# Patient Record
Sex: MALE | Race: BLACK OR AFRICAN AMERICAN
[De-identification: names, ages, dates, MRNs, and addresses within clinical notes are randomized per-mention and may not be internally consistent; named-entity substitution may affect disease eponyms.]

---

## 2020-05-23 ENCOUNTER — HOSPITAL ENCOUNTER (EMERGENCY)
Dept: HOSPITAL 88 - FSED | Age: 35
Discharge: HOME | End: 2020-05-23
Payer: COMMERCIAL

## 2020-05-23 VITALS — WEIGHT: 125 LBS | HEIGHT: 65 IN | BODY MASS INDEX: 20.83 KG/M2

## 2020-05-23 DIAGNOSIS — R10.32: Primary | ICD-10-CM

## 2020-05-23 DIAGNOSIS — Z87.11: ICD-10-CM

## 2020-05-23 DIAGNOSIS — N20.0: ICD-10-CM

## 2020-05-23 DIAGNOSIS — F17.210: ICD-10-CM

## 2020-05-23 DIAGNOSIS — I70.0: ICD-10-CM

## 2020-05-23 DIAGNOSIS — R31.9: ICD-10-CM

## 2020-05-23 PROCEDURE — 74176 CT ABD & PELVIS W/O CONTRAST: CPT

## 2020-05-23 PROCEDURE — 96372 THER/PROPH/DIAG INJ SC/IM: CPT

## 2020-05-23 PROCEDURE — 99284 EMERGENCY DEPT VISIT MOD MDM: CPT

## 2020-05-23 PROCEDURE — 81003 URINALYSIS AUTO W/O SCOPE: CPT

## 2020-05-23 NOTE — XMS REPORT
Patient Summary Document

                             Created on: 2020



JACKLYNJAIDEN

External Reference #: [id number]

: 1985

Sex: Male



Demographics





                          Address                   [address]

[place]  [address]

 

                          Home Phone                [phone]

 

                          Preferred Language        English

 

                          Marital Status            Unknown

 

                          [employer] Affiliation     Unknown

 

                          Race                      Unknown

 

                          Additional Race(s)        Black or 





 

                          Ethnic Group              Non-





Author





                          Author                    [facility]

t

 

                          Organization              [facility]

t

 

                          Address                   [address]

[place]  [address]



 

                          Phone                     Unavailable







Support





                Name            Relationship    Address         Phone

 

                    LUDY QURESHI   PRS                 [address]



[place]  [address]                      [phone]

 

                    LUDY QURESHI   PRS                 [address]



[place]  [address]                      [phone]

 

                    LUDY QURESHI   PRS                 [address]

[place], [place]  [address] [phone]

 

                    LUDY QURESHI   PRS                 [address]



[place]  [address]                      [phone]







Care Team Providers





                    Care Team Member Name Role                Phone

 

                    EzRAISA shah Obiajulu Attphys             [phone]

 

                    EzRAISA shah Obiajulu Admphys             [phone]







Payers





           Payer Name Policy Type Policy Number Effective Date Expiration Date S

ource







Problems





           Condition Name Condition Details Condition Category Status     Onset 

Date Resolution

Date            Last Treatment Date Treating Clinician Comments        Source

 

                          SYNCOPE/VOMITING                                  SYNC

OPE/VOMITING                       

Active                        10/24/2019                                        
                                                       [facility]             
        Diagnosis Active  2019-10-24 00:00:00         2019-10-28 14:58:00       

          [facility]

 

                          BROKEN ARM                                        BROK

EN ARM                        Active     

                  10/25/2016                                                    
                                           [facility]                     

Diagnosis Active    2016-10-25 00:00:00           2016-10-27 09:49:00           

          [facility]

 

                          Acute kidney failure, unspecified                     

    Acute kidney failure, 

unspecified                                                                     
                          10/28/2019                                            
    [place]                     Problem                         2019-10-28 2

1:15:35                 [facility]

 

                          ACUTE KIDNEY FAILURE, UNSPECIFIED                     

    ACUTE KIDNEY FAILURE, 

UNSPECIFIED                        Active                                       
                                                                                
[facility]                     Diagnosis Active                  2019-10-28 14

:58:00                 [facility]

 

                          DEHYDRATION                                       DEHY

DRATION                        Active   

                                                                                
                                   [facility]                     Diagnosis   

              

Active                           2019-10-28 14:58:00                        [facility]

[facility]







Allergies, Adverse Reactions, Alerts





        Allergy Name Allergy Type Status  Severity Reaction(s) Onset Date Inacti

ve Date 

Treating Clinician        Comments                  Source

 

       No Known Allergies DA     Active U             2019-10-30 00:00:00       

               [facility]

 

       No Known Allergies DA     Active U             2019-10-28 00:00:00       

               [facility]

 

       No Known Allergies DA     Active U             2019-10-26 00:00:00       

               [facility]

 

       No Known Allergies DA     Active U             2019 00:00:00       

               [facility]

 

       No Known Allergies DA     Active U             2015 00:00:00       

               [facility]

 

       No Known Medication Allergies No Known Medication Allergies Active       

                                    

[facility]







Social History





                Smoking Status  Start Date      Stop Date       Source

 

                Social History  2019-10-24 18:39:17                 [facility]







Medications





             Ordered Medication Name Filled Medication Name Start Date   Stop Da

te    Current 

Medication? Ordering Clinician Indication Dosage     Frequency  Signature (SIG) 

Comments                  Components                Source

 

     Phenergan      2019-10-26 00:20:00      No                       Notes: (Sa

me as: Phenergan)           [facility]

 

      Morphine       2019-10-26 00:18:00       No                            Not

es: (Same as:MORPhine Sulfate)       

                                        [facility]

 

     Dilaudid      2019-10-26 00:18:00      No                       Notes: Same

 as Dilaudid           [facility]

 

       Macrobid        2019-10-25 03:57:00        Yes                           

     100 mg, PO, BID, # 14 cap, 0 

Refill(s)                                                   [facility]

 

     Melatonin      2019-10-24 22:47:00      No                       Notes: (Sa

me as: Melatonin)           [facility]

 

     Trazodone      2019-10-24 22:47:00      No                       Notes: (Sa

me As: Desyrel)           [facility]

 

       Tylenol        2019-10-24 22:47:00        No                             

    Notes: Do not exceed 4 gm/day.  (Same

as: Tylenol)                                                [facility]

 

      Morphine       2019-10-24 22:47:00       No                            Not

es: (Same as:MORPhine Sulfate)       

                                        [facility]

 

     Dilaudid      2019-10-24 22:47:00      No                       Notes: Same

 as Dilaudid           [facility]

 

     Benadryl      2019-10-24 22:47:00      No                       Notes: (Johan

e as: Benadryl)           [facility]

 

       NS 1,000 mL        2019-10-24 22:44:00        No                         

        1,000 mL, Rate: 125 ml/hr, Infuse

over: 8 hr, Route: IV, Dosing Weight 52.6 kg, Total Volume: 1,000, Start date: 
10/24/19 17:44:00 CDT, Duration: 30 day, Stop date: 19 17:43:00 CST, 1.56,
m2, 0                                                       [facility]

 

                                        Calcium Chloride 0.0014 MEQ/ML / Potassi

um Chloride 0.004 MEQ/ML / Sodium 

Chloride 0.103 MEQ/ML / Sodium Lactate 0.028 MEQ/ML Injectable Solution         

                  

2019-10-24 19:07:00           No                                                

1,000 mL, 1,000 ml/hr, Infuse Over: 1 hr, 

Route: IV, 1,000, Drug form: INJ, ONCE, Priority: STAT, Dosing Weight 52.6 kg, 
Start date: 10/24/19 14:07:00 CDT, Stop date: 10/24/19 14:07:00 CDT, 0          

                               [facility]

 

       Tylenol        2019-10-24 18:23:00        No                             

    Notes: Max acetaminophen 4000 mg/day 

(4 gm/day).  (Same as: Tylenol Extra Strength)                                  

       [facility]

 

       Sodium Chloride 0.9% (Bolus) IV        2019-10-24 17:42:00        No     

                            1,000 mL, 

1000 ml/hr, Infuse Over: 1 hr, Route: IV, 1,000, Drug form: INJ, ONCE, Priority:
STAT, Dosing Weight 52.6 kg, Start date: 10/24/19 12:42:00 CDT, Stop date: 
10/24/19 12:42:00 CDT, 0                                         [facility]

 

      Morphine       2019-10-24 17:42:00       No                            Not

es: (Same as:MORPhine Sulfate)       

                                        [facility]

 

       Ketorolac        2019-10-24 17:42:00        No                           

       4 days   *** MEDICATION WASTE *** 

Product Size:  30 mg Product Wasted:  ___ mg                                    

     [facility]

 

       Ondansetron        2019-10-24 17:42:00        No                         

        Notes: (Same as: Zofran)   *** 

MEDICATION WASTE *** Product Size:  4 mg Product Wasted:  ___ mg                

                         [facility]







Vital Signs





             Vital Name   Observation Time Observation Value Comments     Source

 

             Temperature Oral (F) 2019-10-26 12:56:00 98.2 F                    

[facility]

 

             Heart Rate   2019-10-26 12:56:00                           [facility]

 

             Respitory Rate 2019-10-26 12:56:00                            [facility]

 

             Systolic (mm Hg) 2019-10-26 12:56:00                            [facility]

 

             Diastolic (mm Hg) 2019-10-26 12:56:00                           [facility]

 

             Temperature Oral (F) 2019-10-26 09:00:00 98.5 F                    

[facility]

 

             Heart Rate   2019-10-26 09:00:00                           [facility]

 

             Respitory Rate 2019-10-26 09:00:00                            [facility]

 

             Systolic (mm Hg) 2019-10-26 09:00:00                            [facility]

[facility]

 

             Diastolic (mm Hg) 2019-10-26 09:00:00                           [facility]

 

             Temperature Oral (F) 2019-10-26 05:00:00 98.4 F                    

[facility]

 

             Heart Rate   2019-10-26 05:00:00                           [facility]

 

             Respitory Rate 2019-10-26 05:00:00                            [facility]

 

             Systolic (mm Hg) 2019-10-26 05:00:00                            [facility]

[facility]

 

             Diastolic (mm Hg) 2019-10-26 05:00:00                           [facility]

 

             Height       2019-10-25 03:52:00 167.64 cm                 [facility]

 

             Weight       2019-10-25 03:52:00                           [facility]

 

             BMI Calculated 2019-10-25 03:52:00                            [facility]

 

             Height       2019-10-25 03:38:00 165.1 cm                  [facility]

 

             Height       2019-10-24 17:05:00 165.1 cm                  [facility]

 

             BMI Calculated 2019-10-24 17:05:00                            [facility]

[facility]

 

             Weight       2019-10-24 17:05:00                           [facility]







Procedures

This patient has no known procedures.



Encounters





             Start Date/Time End Date/Time Encounter Type Admission Type Attendi

[facility]   Care Department Encounter ID    Source

 

          2019-10-24 16:51:27 2019-10-26 15:00:00 Inpatient                     

[facility]        [id number]              [facility]

 

           2019-10-24 11:51:27 2019-10-26 10:00:00 Outpatient            Boogie Isaac 

[facility]               [id number]         

 

        2019-10-24 17:54:00 2019-10-24 15:34:00 Inpatient E               [facility]  

  MED     [id number]    [facility]







Results





           Test Description Test Time  Test Comments Results    Result Comments 

Source

 

                SURG            2019 15:53:00                 

--------------------------------------------------------------------------------

------------RUN DATE: 19                   [facility] - LAB     
              PAGE 1   RUN TIME: 1553                            Specimen 
Inquiry                    RUN USER: INTERFACE                                  
                        
----------------------------------------------------------------
----------------------------PATIENT: JAIDEN VILLASENOR                   ACCT #: 
[id number] LOC:  L.PO       U #: [id number]                                   
   AGE/SX: 34/M         ROOM: L.PO5      REG: 10/30/19REG DR:  Km Coyle MD            :    [birth year]     BED:  1          DIS: 10/31/19               
                       STATUS: DIS Aura      TLOC:           
----------------------------
---------------------------------------------------------------- SPEC #: 
[id number]       RECD: 10/31/     STATUS:  SOUT           REQ #: 
[id number]                           KIARA: 10/30/     SUBM DR: 
Km Coyle MD             ENTERED:  10/31/    SP TYPE: SURG         
 OTHR DR: Self Referred                                                         
                   Shah,Jignesh P MD Watkins,Ulysses W Jr MDORDERED:  SURG PATH LVL              
                                                    COPIES TO:   Self Referred  
 Km Coyle MD   [address] [place] [address] [phone]   
Mikal Matt MD   [address]   [place] [address]   [phone]    
Watkins,Ulysses W Jr MD   [address] [place] [address]   [phone] 
HISTOLOGY:   TISSUE          ID    BLK   PCS ROBB LEV PROCEDURE       DISPOSITION
  _______________ ____ ______ ___ ___ ___ _______________ ___________   STOMACH,
NOS    A      1              2                    STOMACH, NOS    B      1      
       2                  PROCEDURES: SURG PATH LVL 4 (10/31/) TISSUES:  
        A. STOMACH, NOS - GASTRIC BIOPSY         B. STOMACH, NOS - GASTRIC ULCER
BIOPSY         CLINICAL HISTORY    ABDOMINAL PAIN            CPT CODES    CPT 
CODE(S): 88305X2    , 88312x2   , 88313x2    ,           ,           ,          
 ,                                   ** CONTINUED ON NEXT PAGE ** 
--------------------------------------------------------------------------------

------------RUN DATE: 19                   [facility]     
              PAGE 2   RUN TIME: 1553                            Specimen 
Inquiry                    RUN USER: INTERFACE                                  
                        
--------------------------------------------------------------------------------

------------SPEC #: [id number]      PATIENT: JAIDEN VILLASENOR                    
#[id number]  (Continued)---------
--------------------------------------------------------------------------------

---          FINAL DIAGNOSIS    A.  Stomach, biopsy:       ACUTE GASTRITIS WITH 
OCCASIONAL HELICOBACTER PYLORI ORGANISMS   FOCAL INTESTINAL METAPLASIA   
NEGATIVE FOR DYSPLASIA OR MALIGNANCY       B.  Stomach, biopsy:       ACUTE 
GASTRITIS WITH RARE HELICOBACTER PYLORI ORGANISMS   FOCAL INTESTINAL METAPLASIA 
 NEGATIVE FOR DYSPLASIA OR MALIGNANCY        GROSS DESCRIPTION    A.  Gastric 
biopsy.  Received in formalin are two tan tissue fragments, 0.2 and   0.3 cm, 
all as A.        B.  Gastric ulcer biopsy.  Received in formalin is a tan tissue
fragment, 0.3   cm, all as B.  ba/nr       Grossing performed at [facility], 
[facility] [facility], [address],    [place], [place] [address].  Medical 
Director: Rafael Perez M.D.     MICROSCOPIC DESCRIPTION    A.  Gastric 
biopsy. Sections demonstrate gastric mucosa with acute and chronic   
inflammation.  No dysplasia or malignancy is identified.  Alcian blue-PAS   
confirms the presence of focal intestinal metaplasia.  The Diff Quik stain   
demonstrates occasional Helicobacter pylori organisms.       B.  Gastric ulcer 
biopsy. Sections of gastric gastric mucosa with a reactive   appearance and 
acute and chronic inflammation.  Alcian blue-PAS confirm the   presence of focal
intestinal metaplasia.  The Diff Quik stain demonstrates rare   Helicobacter 
pylori organisms.--------
--------------------------------------------------------------------------------

---- Signed SIGNATURE ON FILE                        Johnathon Quick 19 
1553     ----------------------------------
----------------------------------------------------------                      
                     ** END OF REPORT **                             

 

                - CT ABDOMEN W/O CONTRAST 2019-10-30 12:32:00                   

Name: JAIDEN VILLASENOR                      : 1985 Age/S: 34  / M      
  [id number]              Unit #: [id number]     Loc:               
[place] [address]                Phys: Km Coyle MD                      
                           Acct: [id number]  Dis Date:               Status: 
ADM IN                                  PHONE #: [phone]     Exam Date: 
10/30/2019  1210                     FAX #:                   Reason: epigastric
pain                                     EXAMS:                                 
             CPT:           [id number] CT ABDOMEN W/O CONTRAST                   
[id number]                    EXAM:  - CT ABDOMEN W/O CONTRAST               History:
Epigastric pain.               Location code:S17               Technique:       
Contrast - No IV contrast was given,  oral contrast was given       Noncontrast 
phase - abdomen       Reconstructions - coronal and sagittal planes             
 COMPARISON: Abdominal radiograph earlier today.               FINDINGS:        
Statements: Lack of intravenous contrast compromises evaluation of       solid 
organs and vasculature.                Thoracic: Included images of the lower 
chest demonstrate no       abnormalities.                Hepatobiliary: No 
abnormality identified in the liver. The gallbladder       is unremarkable.     
         Pancreas: Limited evaluation due to a paucity of intra-abdominal fat   
   and lack of intravenous contrast. Overall normal noncontrast       appearance
of the pancreas without evidence of definite discrete focal       mass or main 
duct dilatation. No drainable peripancreatic fluid       collection seen.       
       Spleen: No abnormality identified in the spleen.                Adrenals:
No abnormality identified in either adrenal gland.                Genitourinary:
No parenchymal abnormality identified in either kidney.       No hydronephrosis 
or nephrolithiasis.                Gastrointestinal: Bowel evaluation is 
compromised as the pelvis was       not imaged. Enteric contrast is present in 
the stomach. There is mild       wall thickening seen along the distal gastric 
body/antrum, which is       nonspecific and may be secondary to underdistention 
versus very mild       gastritis. There are no dilated loops of small or large 
bowel seen to       suggest obstruction.               Vascular/Lymphatics: No 
enlarged lymph nodes by CT size criteria.       Abdominal aorta is normal in 
caliber. Mild atherosclerotic aortoiliac     PAGE  1                       
Signed Report                    (CONTINUED)   Name: JAIDEN VILLASENOR [place]                      : 1985 Age/S: 34  / M         
[id number]              Unit #: [id number]     Loc:               
[place] [address]                Phys: Km Coyle MD                      
                           Acct: [id number]  Dis Date:               Status: 
ADM IN                                  PHONE #: [phone]     Exam Date: 
10/30/2019  1210                     FAX #:                   Reason: epigastric
pain                                     EXAMS:                                 
             CPT:           [id number] CT ABDOMEN W/O CONTRAST                   
[id number]               <Continued>        calcifications are noted, greater than 
expected for age.               MSK/Body Wall: No concerning bony lesion 
identified.               Peritoneum/Other: In the abdomen, there is no 
extraluminal air. No       extraluminal fluid.                 IMPRESSION:      
            Nonspecific mild wall thickening along the distal gastric 
body/antrum         which may be secondary to underdistention versus very mild 
gastritis.         No dilated bowel loops seen to suggest obstruction.         
Limited evaluation of the pancreas due to lack of intra-abdominal fat         
and intravenous contrast. No obvious CT evidence of acute         pancreatitis. 
                        ** Electronically Signed by JOHN Diaz 
**        **                on 10/30/2019 at 1232                **             
        Reported and signed by: Ree Diaz M.D.                     
CC: Km Coyle MD; Ulysses W [place] Jr MD                                  
                                                           Technologist:Yimi Razo, RT(R)(CT)      CTDI:        DLP:        Trnscb Date/Time: 10/30/2019 
(1232) [id number]                        Orig Print D/T: S: 10/30/2019 (7369)    
PAGE  2                       Signed Report                                     

                      

 

                - CT ABDOMEN W/O CONTRAST 2019-10-30 12:32:00                   

Name: JAIDEN VILLASENOR [place]                      : 1985 Age/S: 34  / M      
  [id number]              Unit #: [id number]     Loc:               
[place] [address]                Phys: Km Coyle MD                      
                           Acct: [id number]  Dis Date:   10/31/2019  Status: 
DIS IN                                  PHONE #: [phone]     Exam Date: 
10/30/2019  1210                     FAX #:                   Reason: epigastric
pain                                     EXAMS:                                 
             CPT:           [id number] CT ABDOMEN W/O CONTRAST                   
[id number]                    EXAM:  - CT ABDOMEN W/O CONTRAST               History:
Epigastric pain.               Location code:S17               Technique:       
Contrast - No IV contrast was given,  oral contrast was given       Noncontrast 
phase - abdomen       Reconstructions - coronal and sagittal planes             
 COMPARISON: Abdominal radiograph earlier today.               FINDINGS:        
Statements: Lack of intravenous contrast compromises evaluation of       solid 
organs and vasculature.                Thoracic: Included images of the lower 
chest demonstrate no       abnormalities.                Hepatobiliary: No 
abnormality identified in the liver. The gallbladder       is unremarkable.     
         Pancreas: Limited evaluation due to a paucity of intra-abdominal fat   
   and lack of intravenous contrast. Overall normal noncontrast       appearance
of the pancreas without evidence of definite discrete focal       mass or main 
duct dilatation. No drainable peripancreatic fluid       collection seen.       
       Spleen: No abnormality identified in the spleen.                Adrenals:
No abnormality identified in either adrenal gland.                Genitourinary:
No parenchymal abnormality identified in either kidney.       No hydronephrosis 
or nephrolithiasis.                Gastrointestinal: Bowel evaluation is 
compromised as the pelvis was       not imaged. Enteric contrast is present in 
the stomach. There is mild       wall thickening seen along the distal gastric 
body/antrum, which is       nonspecific and may be secondary to underdistention 
versus very mild       gastritis. There are no dilated loops of small or large 
bowel seen to       suggest obstruction.               Vascular/Lymphatics: No 
enlarged lymph nodes by CT size criteria.       Abdominal aorta is normal in 
caliber. Mild atherosclerotic aortoiliac     PAGE  1                       
Signed Report                    (CONTINUED)   Name: JAIDEN VILLASENOR                      : 1985 Age/S: 34  / M         
[address] [facility] [place]              Unit #: [id number]     Loc:               
[place] [address]                Phys: Km Coyle MD                      
                           Acct: [id number]  Dis Date:   10/31/2019  Status: 
DIS IN                                  PHONE #: [phone]     Exam Date: 
10/30/2019  1210                     FAX #:                   Reason: epigastric
pain                                     EXAMS:                                 
             CPT:           [id number] CT ABDOMEN W/O CONTRAST                   
73856               <Continued>        calcifications are noted, greater than 
expected for age.               MSK/Body Wall: No concerning bony lesion 
identified.               Peritoneum/Other: In the abdomen, there is no 
extraluminal air. No       extraluminal fluid.                 IMPRESSION:      
            Nonspecific mild wall thickening along the distal gastric 
body/antrum         which may be secondary to underdistention versus very mild 
gastritis.         No dilated bowel loops seen to suggest obstruction.         
Limited evaluation of the pancreas due to lack of intra-abdominal fat         
and intravenous contrast. No obvious CT evidence of acute         pancreatitis. 
                        ** Electronically Signed by JOHN Diaz 
**        **                on 10/30/2019 at 1232                **             
        Reported and signed by: Ree Diaz M.D.                     
CC: Km Coyle MD; Ulysses W Watkins Jr MD                                  
                                                           Technologist:Yimi Razo, RT(R)(CT)      CTDI:        DLP:        Trnscb Date/Time: 10/30/2019 
(1232) t.SDR.KW9                        Orig Print D/T: S: 10/30/2019 (7977)    
PAGE  2                       Signed Report                                     

                      

 

                - CT ABDOMEN W/O CONTRAST 2019-10-30 12:32:00                   

Name: JAIDEN VILLASENOR                      : 1985 Age/S: 34  / M      
  [address] [place]              Unit #: [id number]     Loc:               
[place] [address]                Phys: Km Coyle MD                      
                           Acct: [id number]  Dis Date:   10/31/2019  Status: 
DIS IN                                  PHONE #: [phone]     Exam Date: 
10/30/2019  1210                     FAX #:                   Reason: epigastric
pain                                     EXAMS:                                 
             CPT:           [id number] CT ABDOMEN W/O CONTRAST                   
[id number]                    EXAM:  - CT ABDOMEN W/O CONTRAST               History:
Epigastric pain.               Location code:S17               Technique:       
Contrast - No IV contrast was given,  oral contrast was given       Noncontrast 
phase - abdomen       Reconstructions - coronal and sagittal planes             
 COMPARISON: Abdominal radiograph earlier today.               FINDINGS:        
Statements: Lack of intravenous contrast compromises evaluation of       solid 
organs and vasculature.                Thoracic: Included images of the lower 
chest demonstrate no       abnormalities.                Hepatobiliary: No 
abnormality identified in the liver. The gallbladder       is unremarkable.     
         Pancreas: Limited evaluation due to a paucity of intra-abdominal fat   
   and lack of intravenous contrast. Overall normal noncontrast       appearance
of the pancreas without evidence of definite discrete focal       mass or main 
duct dilatation. No drainable peripancreatic fluid       collection seen.       
       Spleen: No abnormality identified in the spleen.                Adrenals:
No abnormality identified in either adrenal gland.                Genitourinary:
No parenchymal abnormality identified in either kidney.       No hydronephrosis 
or nephrolithiasis.                Gastrointestinal: Bowel evaluation is 
compromised as the pelvis was       not imaged. Enteric contrast is present in 
the stomach. There is mild       wall thickening seen along the distal gastric 
body/antrum, which is       nonspecific and may be secondary to underdistention 
versus very mild       gastritis. There are no dilated loops of small or large 
bowel seen to       suggest obstruction.               Vascular/Lymphatics: No 
enlarged lymph nodes by CT size criteria.       Abdominal aorta is normal in 
caliber. Mild atherosclerotic aortoiliac     PAGE  1                       
Signed Report                    (CONTINUED)   Name: JAIDEN VILLASENOR Shanks                      : 1985 Age/S: 34  / M         
[id number] [place]              Unit #: [id number]     Loc:               
[place] [address]                Phys: Km Coyle MD                      
                           Acct: [id number]  Dis Date:   10/31/2019  Status: 
DIS IN                                  PHONE #: [phone]     Exam Date: 
10/30/2019  1210                     FAX #:                   Reason: epigastric
pain                                     EXAMS:                                 
             CPT:           [id number] CT ABDOMEN W/O CONTRAST                   
[address]               <Continued>        calcifications are noted, greater than 
expected for age.               MSK/Body Wall: No concerning bony lesion 
identified.               Peritoneum/Other: In the abdomen, there is no 
extraluminal air. No       extraluminal fluid.                 IMPRESSION:      
            Nonspecific mild wall thickening along the distal gastric 
body/antrum         which may be secondary to underdistention versus very mild 
gastritis.         No dilated bowel loops seen to suggest obstruction.         
Limited evaluation of the pancreas due to lack of intra-abdominal fat         
and intravenous contrast. No obvious CT evidence of acute         pancreatitis. 
                        ** Electronically Signed by JOHN Diaz 
**        **                on 10/30/2019 at 1232                **             
        Reported and signed by: Ree Diaz M.D.                     
CC: Km Coyle MD; Ulysses W Watkins Jr MD                                  
                                                           Technologist:Yimi Razo, RT(R)(CT)      CTDI:        DLP:        Trnscb Date/Time: 10/30/2019 
(1232) t.SDR.KW9                        Orig Print D/T: S: 10/30/2019 (3596)    
PAGE  2                       Signed Report                                     

                      

 

                - CT ABDOMEN W/O CONTRAST 2019-10-30 12:32:00                   

Name: JAIDEN VILLASENOR                      : 1985 Age/S: 34  / M      
  [address] [address] [place]              Unit #: [id number]     Loc:               
[place] [address]                Phys: Km Coyle MD                      
                           Acct: [id number]  Dis Date:               Status: 
DIS IN                                  PHONE #: [phone]     Exam Date: 
10/30/2019  1215                     FAX #:                   Reason: epigastric
pain                                     EXAMS:                                 
             CPT:           [id number] CT ABDOMEN W/O CONTRAST                   
[address]127                    EXAM:  - CT ABDOMEN W/O CONTRAST               History:
Epigastric pain.               Location code:S17               Technique:       
Contrast - No IV contrast was given,  oral contrast was given       Noncontrast 
phase - abdomen       Reconstructions - coronal and sagittal planes             
 COMPARISON: Abdominal radiograph earlier today.               FINDINGS:        
Statements: Lack of intravenous contrast compromises evaluation of       solid 
organs and vasculature.                Thoracic: Included images of the lower 
chest demonstrate no       abnormalities.                Hepatobiliary: No 
abnormality identified in the liver. The gallbladder       is unremarkable.     
         Pancreas: Limited evaluation due to a paucity of intra-abdominal fat   
   and lack of intravenous contrast. Overall normal noncontrast       appearance
of the pancreas without evidence of definite discrete focal       mass or main 
duct dilatation. No drainable peripancreatic fluid       collection seen.       
       Spleen: No abnormality identified in the spleen.                Adrenals:
No abnormality identified in either adrenal gland.                Genitourinary:
No parenchymal abnormality identified in either kidney.       No hydronephrosis 
or nephrolithiasis.                Gastrointestinal: Bowel evaluation is 
compromised as the pelvis was       not imaged. Enteric contrast is present in 
the stomach. There is mild       wall thickening seen along the distal gastric 
body/antrum, which is       nonspecific and may be secondary to underdistention 
versus very mild       gastritis. There are no dilated loops of small or large 
bowel seen to       suggest obstruction.               Vascular/Lymphatics: No 
enlarged lymph nodes by CT size criteria.       Abdominal aorta is normal in 
caliber. Mild atherosclerotic aortoiliac     PAGE  1                       
Signed Report                    (CONTINUED)   Name: JAIDEN VILLASENOR Shanks                      : 1985 Age/S: 34  / M         
[id number] [place]              Unit #: [id number]     Loc:               
[place] [address]                Phys: Km Coyle MD                      
                           Acct: [id number]  Dis Date:               Status: 
DIS IN                                  PHONE #: [phone]     Exam Date: 
10/30/2019  1210                     FAX #:                   Reason: epigastric
pain                                     EXAMS:                                 
             CPT:           [id number] CT ABDOMEN W/O CONTRAST                   
[address]               <Continued>        calcifications are noted, greater than 
expected for age.               MSK/Body Wall: No concerning bony lesion 
identified.               Peritoneum/Other: In the abdomen, there is no 
extraluminal air. No       extraluminal fluid.                 IMPRESSION:      
            Nonspecific mild wall thickening along the distal gastric 
body/antrum         which may be secondary to underdistention versus very mild 
gastritis.         No dilated bowel loops seen to suggest obstruction.         
Limited evaluation of the pancreas due to lack of intra-abdominal fat         
and intravenous contrast. No obvious CT evidence of acute         pancreatitis. 
                        ** Electronically Signed by JOHN Diaz 
**        **                on 10/30/2019 at 1232                **             
        Reported and signed by: Ree Diaz M.D.                     
CC: Km Coyle MD; Ulysses W Watkins Jr MD                                  
                                                           Technologist:Yimi Razo, RT(R)(CT)      CTDI:        DLP:        Trnscb Date/Time: 10/30/2019 
(6782) [email or web address].KW9                        Orig Print D/T: S: 10/30/2019 (1745)    
PAGE  2                       Signed Report                                     

                      

 

                    BASIC METABOLIC PANEL 2019-10-30 07:10:00   

 

                                        Test Item

 

             SODIUM (test code = NA) 134 mmol/L   134-147      N             

 

             POTASSIUM (test code = K) 3.4 mmol/L   3.4-5.0      N             

 

             CHLORIDE (test code = CL) 97 mmol/L    100-108      L             

 

             CARBON DIOXIDE (test code = CO2) 29 mmol/L    21-32        N       

      

 

             ANION GAP (test code = GAP) 8.0 GAP calc 4.0-15.0     N            

 

 

             GLUCOSE (test code = GLU) 96 MG/DL            N             

 

             BLOOD UREA NITROGEN (test code = BUN) 27 MG/DL     7-18         H  

           

 

             GLOMERULAR FILTRATION RATE (test code = GFR) >=60 max estimate estG

FR >60                        

 

             CREATININE (test code = CREAT) 1.2 MG/DL    0.8-1.3      N         

    

 

             CALCIUM (test code = CA) 9.5 MG/DL    8.5-10.1     N             





HEPATIC FUNCTION PANEL2019-10-30 07:10:00* 



             Test Item    Value        Reference Range Interpretation Comments

 

             TOTAL PROTEIN (test code = PROT) 9.0 G/DL     6.4-8.2      H       

      

 

             ALBUMIN (test code = ALB) 4.7 G/DL     3.4-5.0      N             

 

             BILIRUBIN TOTAL (test code = BILT) 0.60 MG/DL   0.2-1.2      N     

        

 

             BILIRUBIN DIRECT (test code = BILD) 0.20 MG/DL   0.00-0.30    N    

         

 

             BILIRUBIN INDIRECT (test code = BILIND) 0.40 MG/DL   0.2-1.2      N

             

 

             SGOT/AST (test code = AST) 20 Unit/L    15-37        N             

 

             SGPT/ALT (test code = ALT) 18 Unit/L    12-78        N             

 

             ALKALINE PHOSPHATASE TOTAL (test code = ALKP) 86 Unit/L      

     N             





LIPASE2019-10-30 07:10:00* 



             Test Item    Value        Reference Range Interpretation Comments

 

             LIPASE (test code = LIP) 390 Unit/L   114-286      H             





CBC W/AUTO DIFF2019-10-30 07:01:00* 



             Test Item    Value        Reference Range Interpretation Comments

 

             WHITE BLOOD CELL (test code = WBC) 8.2 K/mm3    3.5-11.0     N     

        

 

             RED BLOOD CELL (test code = RBC) 4.62 M/mm3   4.70-6.10    L       

      

 

             HEMOGLOBIN (test code = HGB) 14.8 G/DL    12.3-15.9    N           

  

 

             HEMATOCRIT (test code = HCT) 42.9 %       35.8-46.7    N           

  

 

             MEAN CELL VOLUME (test code = MCV) 92.9 Fl      86.3-98.9    N     

        

 

             MEAN CELL HGB (test code = MCH) 32.0 pg      28.9-34.4    N        

     

 

             MEAN CELL HGB CONCETRATION (test code = MCHC) 34.5 G/DL    32.1-34.

5    N             

 

             RED CELL DISTRIBUTION WIDTH (test code = RDW) 12.2 SD      11.5-14.

5    N             

 

             PLATELET COUNT (test code = PLT) 372.0 K/mm3  150-450      N       

      

 

             MEAN PLATELET VOLUME (test code = MPV) 8.50 fL      7.0-9.6      N 

            

 

             NEUTROPHIL % (test code = NT%) 52.6 %       40-76        N         

    

 

             LYMPHOCYTE % (test code = LY%) 35.9 %       20.5-51.1    N         

    

 

             MONOCYTE % (test code = MO%) 10.3 %       1.7-9.3      H           

  

 

             EOSINOPHIL % (test code = EO%) 1.1 %        0.0-6.0      N         

    

 

             BASOPHIL % (test code = BA%) 0.1 %        0.0-2.0      N           

  

 

             NEUTROPHIL # (test code = NT#) 4.32 K/mm3   1.8-7.6      N         

    

 

             LYMPHOCYTE # (test code = LY#) 3.0 K/mm3    0.6-3.0      N         

    

 

             MONOCYTE # (test code = MO#) 0.9 K/mm3    0.2-1.5      N           

  

 

             EOSINOPHIL # (test code = EO#) 0.1 K/mm3    0.0-0.4      N         

    

 

             BASOPHIL # (test code = BA#) 0.0 K/mm3    0.0-0.2      N           

  

 

             MANUAL DIFF REQUIRED (test code = MDIFF) NO DIFF/SCN  CRITERIA     

              





- XR ABDOMEN 2 -10-30 07:00:00 Name: JAIDEN VILLASENOR Shanks                      : 1985 Age/S: 34  / M         [id number] 
[address] [place]              Unit #: [id number]     Loc:               [place]
[address]                Phys: Bar Duran MD                                    
              Acct: [id number]  Dis Date:               Status: ADM IN        
                         PHONE #: [phone]     Exam Date: 10/30/2019  06  
                  FAX #:                  Reason: epigastric abdominal pain     
                    EXAMS:                                               CPT:   
       [id number] XR ABDOMEN 2 V                             28731             
Fluoro Time:            DAP (Gy m2):          Air Kerma (mGy):              
AFTER HOURS SERVICE ON: 10/30/2019 6:59 AM               Abdomen, 4 View        
      Location Code M12               History: epigastric abdominal pain        
      Findings:               The bowel gas pattern appears unremarkable. There 
is no evidence of       bowel dilatation.  There is no free air on the upright 
view.                 Impression:                    Unremarkable abdomen.      
                                                                                
 ** Electronically Signed by JOHN Quintero **          **              
on 10/30/2019 at 0700              **                      Reported and signed 
by: Dominga Quintero M.D.                    CC: Bar Duran MD; Ulysses W Watkins Jr MD                                                                   
                            PAGE  1                       Signed Report         
                       Name: JAIDEN VILLASENOR Shanks   
                  : 1985 Age/S: 34  / M         [address] [place] [address]     
        Unit #: [id number]     Loc:               [place] [address]            
   Phys: Bar Duran MD                                                   
Acct: [id number]  Dis Date:               Status: ADM IN                      
           PHONE #: [phone]     Exam Date: 10/30/2019  0655                
    FAX #:                  Reason: epigastric abdominal pain                   
      EXAMS:                                               CPT:           
[id number] XR ABDOMEN 2 V                             39026             Fluoro 
Time:            DAP (Gy m2):          Air Kerma (mGy):         <Continued> 
Technologist: Gaurang Millan RT(R)                                     Trnscb Cristobal
e/Time: 10/30/2019 (0700) [email or web address]                       Orig Print D/T: S: 10
/ (0703)                                             PAGE  2             
         Signed Report                               - XR ABDOMEN 2 -10-30 
07:00:00 Name: JAIDEN VILLASENOR Shanks                 
    : 1985 Age/S: 34  / M         [facility] [address] [place]              Unit 
#: [id number]     Loc:               [place] [address]                Phys: 
Bar Duran MD                                                   Acct: 
[id number]  Dis Date:               Status: DIS IN                            
     PHONE #: [phone]     Exam Date: 10/30/2019  0655                     
FAX #:                  Reason: epigastric abdominal pain                       
  EXAMS:                                               CPT:           [id number] 
XR ABDOMEN 2 V                             78437             Fluoro Time:       
    DAP (Gy m2):          Air Kerma (mGy):              AFTER HOURS SERVICE ON: 
10/30/2019 6:59 AM               Abdomen, 4 View               Location Code M12
              History: epigastric abdominal pain               Findings:        
      The bowel gas pattern appears unremarkable. There is no evidence of       
bowel dilatation.  There is no free air on the upright view.                 
Impression:                    Unremarkable abdomen.                            
                                                            ** Electronically 
Signed by JOHN Quintero **          **              on 10/30/2019 at 
0700              **                      Reported and signed by: Dominga Quintero M.D.                    CC: Bar Duran MD; Ulysses W Watkins Jr MD 
                                                                                
             PAGE  1                       Signed Report                        
        Name: JAIDEN VILLASENOR Shanks                  
   : 1985 Age/S: 34  / M         [address] [place] [address]              Unit 
#: [id number]     Loc:               [place] [address]                Phys: 
Bar Duran MD                                                   Acct: 
[id number]  Dis Date:               Status: DIS IN                            
     PHONE #: [phone]     Exam Date: 10/30/2019  0655                     
FAX #:                  Reason: epigastric abdominal pain                       
  EXAMS:                                               CPT:           [id number] 
XR ABDOMEN 2 V                             76832             Fluoro Time:       
    DAP (Gy m2):          Air Kerma (mGy):         <Continued> Technologist: 
RT Mary Jane(R)                                     Trnscb Date/Time: 
10/30/2019 (07) [id number]                       Orig Print D/T: S: 10/30/2019
(0703)                                             PAGE  2                      
Signed Report                               - XR ABDOMEN 2 -10-30 07:00:00 
Name: JAIDEN VILLASENOR Shanks                      :
1985 Age/S: 34  / M         [address] [place]              Unit #: [id number]
[id number]     Loc:               [place] [address]                Phys: Anne Duran MD                                                   Acct: [id number]  Di
s Date:               Status: REG ER                                  PHONE #: [phone]
[phone]     Exam Date: 10/30/2019  0655                     FAX #:          
       Reason: epigastric abdominal pain                          EXAMS:        
                                      CPT:           [id number] XR ABDOMEN 2 V   
                         28172             Fluoro Time:            DAP (Gy m2): 
        Air Kerma (mGy):              AFTER HOURS SERVICE ON: 10/30/2019 6:59 AM
              Abdomen, 4 View               Location Code M12               
History: epigastric abdominal pain               Findings:               The dung
wel gas pattern appears unremarkable. There is no evidence of       bowel dilata
tion.  There is no free air on the upright view.                 Impression:    
               Unremarkable abdomen.                                            
                                            ** Electronically Signed by JOHN Quintero **          **              on 10/30/2019 at 0700              
**                      Reported and signed by: Dominga Quintero M.D.         
          CC: Bar Duran MD; Ulysses W Watkins Jr MD                         
                                                                      PAGE  1   
                   Signed Report                                 Name: JAIDEN VILLASENOR Shanks                      : 1985 
Age/S: 34  / M         [facility] [address] [place]              Unit #: [id number]     Lo
c:               [place] [address]                Phys: Bar Duran MD      
                                            Acct: [id number]  Dis Date:       
       Status: REG ER                                  PHONE #: [phone]    
Exam Date: 10/30/2019  0655                     FAX #:                  Reason: 
epigastric abdominal pain                          EXAMS:                       
                       CPT:           [id number] XR ABDOMEN 2 V                  
          96900             Fluoro Time:            DAP (Gy m2):          Air 
Kerma (mGy):         <Continued> Technologist: RT Mary Jane(R)               
                     Trnscb Date/Time: 10/30/2019 (07) MineMA50             
         Orig Print D/T: S: 10/30/2019 (0703)                                   
         PAGE  2                       Signed Report                            
  - CT ABD PELVIS W/CONT2019-10-28 21:15:00  [facility]                      : 1985 Age/S: 34  / M         
[id number]              Unit #: [id number]     Loc:               
[place] [address]                Phys: Jamaal Holm MD                      
                           Acct: [id number]  Dis Date:               Status: 
[facility] ER                                  PHONE #: [phone]     Exam Date: 
10/28/2019  2110                     FAX #:                   Reason: epigastric
pain, vomiting                           EXAMS:                                 
             CPT:           [id number] CT ABD PELVIS W/CONT                      
11043                    LOCATION: T18               EXAM: CT ABDOMEN AND PELVIS
WITH CONTRAST               INDICATION: epigastric pain, vomiting               
COMPARISON: None.               TECHNIQUE: Multiple CT images of the abdomen and
pelvis were obtained       with reconstructions in the coronal and sagittal 
planes. 100 ml of       Isovue 300 was given intravenously.  Up-to-date CT 
equipment and       radiation dose reduction techniques were utilized.  
Automatic exposure       control was utilized.               FINDINGS: Lung 
bases are clear.               Liver and spleen are normal in appearance.  
Adrenal glands and       pancreas normal.  Gallbladder is contracted.  No 
biliary distention.        Portal vasculature is patent.               Kidneys e
nhance symmetrically.  No focal abnormality or hydronephrosis       is seen.  Th
ere is no hydroureter.  Urinary bladder is normal in       appearance.  Prostate
is normal in size.               No free air or free fluid is present.  No bowel
obstruction is seen.        The appendix is not identified.  Very little mesen
teric fat limits       evaluation.  Atherosclerotic calcification of the distal 
abdominal       aorta       Vessels present.               Bones are intact.  Pe
ripheral soft tissues are unremarkable.                 IMPRESSION: No acute abn
ormality identified.          ** Electronically Signed by JOHN Louis on  at  **                      Reported and signed by: Vega Louis M.D.       CC:                                                                  
                                                                      Technolog
ist:RT Robin(R)(CT)(MRI)      CTDI:        DLP:        Trnscb Date/Time:  () t.SDR.JP19                       Orig Print D/T: S: 10/28/2019 
()     PAGE  1                       Signed Report                          
    - CT ABD PELVIS W/CONT2019-10-28 21:15:00  Name: JAIDEN VILLASENOR                      : 1985 Age/S: 34  / M         
[id number]              Unit #: [id number]     Loc:               
[place] [address]                Phys: Jamaal Holm MD                      
                           Acct: [id number]  Dis Date:               Status: 
DEP ER                                  PHONE #: [phone]     Exam Date: 
10/28/2019  2110                     FAX #:                   Reason: epigastric
pain, vomiting                           EXAMS:                                 
             CPT:           [id number] CT ABD PELVIS W/CONT                      
[id number]                    LOCATION: T18               EXAM: CT ABDOMEN AND PELVIS
WITH CONTRAST               INDICATION: epigastric pain, vomiting               
COMPARISON: None.               TECHNIQUE: Multiple CT images of the abdomen and
pelvis were obtained       with reconstructions in the coronal and sagittal 
planes. 100 ml of       Isovue 300 was given intravenously.  Up-to-date CT 
equipment and       radiation dose reduction techniques were utilized.  
Automatic exposure       control was utilized.               FINDINGS: Lung 
bases are clear.               Liver and spleen are normal in appearance.  
Adrenal glands and       pancreas normal.  Gallbladder is contracted.  No 
biliary distention.        Portal vasculature is patent.               Kidneys e
nhance symmetrically.  No focal abnormality or hydronephrosis       is seen.  Th
ere is no hydroureter.  Urinary bladder is normal in       appearance.  Prostate
is normal in size.               No free air or free fluid is present.  No bowel
obstruction is seen.        The appendix is not identified.  Very little mesen
teric fat limits       evaluation.  Atherosclerotic calcification of the distal 
abdominal       aorta       Vessels present.               Bones are intact.  Pe
ripheral soft tissues are unremarkable.                 IMPRESSION: No acute abn
ormality identified.          ** Electronically Signed by JOHN Louis on  at 2115 **                      Reported and signed by: Vega Louis M.D.       CC:                                                                  
                                                                      Technolog
ist:RT Robin(R)(CT)(MRI)      CTDI:        DLP:        Trnscb Date/Time:  () t.SDR.JP19                       Orig Print D/T: S: 10/28/2019 
()     PAGE  1                       Signed Report                          
    - CT ABD PELVIS W/CONT2019-10-28 21:15:00  Name: JAIDEN VILLASENOR                
      [facility]                      : 1985 Age/S: 34  / M         
[id number]              Unit #: [id number]     Loc:               
[place] [address]                Phys: Jamaal Holm MD                      
                           Acct: [id number]  Dis Date:               Status: 
REG ER                                  PHONE #: [phone]     Exam Date: 
10/28/2019  2110                     FAX #:                   Reason: epigastric
pain, vomiting                           EXAMS:                                 
             CPT:           [id number] CT ABD PELVIS W/CONT                      
[address]                    LOCATION: T18               EXAM: CT ABDOMEN AND PELVIS
WITH CONTRAST               INDICATION: epigastric pain, vomiting               
COMPARISON: None.               TECHNIQUE: Multiple CT images of the abdomen and
pelvis were obtained       with reconstructions in the coronal and sagittal 
planes. 100 ml of       Isovue 300 was given intravenously.  Up-to-date CT 
equipment and       radiation dose reduction techniques were utilized.  
Automatic exposure       control was utilized.               FINDINGS: Lung 
bases are clear.               Liver and spleen are normal in appearance.  
Adrenal glands and       pancreas normal.  Gallbladder is contracted.  No 
biliary distention.        Portal vasculature is patent.               Kidneys e
nhance symmetrically.  No focal abnormality or hydronephrosis       is seen.  Th
ere is no hydroureter.  Urinary bladder is normal in       appearance.  Prostate
is normal in size.               No free air or free fluid is present.  No bowel
obstruction is seen.        The appendix is not identified.  Very little mesen
teric fat limits       evaluation.  Atherosclerotic calcification of the distal 
abdominal       aorta       Vessels present.               Bones are intact.  Pe
ripheral soft tissues are unremarkable.                 IMPRESSION: No acute abn
ormality identified.          ** Electronically Signed by JOHN Louis on  at  **                      Reported and signed by: Vega Louis M.D.       CC:                                                                  
                                                                      Technolog
ist:RT Robin(R)(CT)(MRI)      CTDI:        DLP:        Trnscb Date/Time:  () t.SDR.JP19                       Orig Print D/T: S: 10/28/2019 
()     PAGE  1                       Signed Report                          
    BASIC METABOLIC PANEL2019-10-28 20:40:00* 



             Test Item    Value        Reference Range Interpretation Comments

 

             SODIUM (test code = NA) 136 mmol/L   134-147      N             

 

             POTASSIUM (test code = K) 3.6 mmol/L   3.4-5.0      N             

 

             CHLORIDE (test code = CL) 99 mmol/L    100-108      L             

 

             CARBON DIOXIDE (test code = CO2) 27 mmol/L    21-32        N       

      

 

             ANION GAP (test code = GAP) 10.0 GAP calc 4.0-15.0     N           

  

 

             GLUCOSE (test code = GLU) 98 MG/DL            N             

 

             BLOOD UREA NITROGEN (test code = BUN) 33 MG/DL     7-18         H  

           

 

             GLOMERULAR FILTRATION RATE (test code = GFR) >=60 max estimate estG

FR >60                        

 

             CREATININE (test code = CREAT) 1.4 MG/DL    0.8-1.3      H         

    

 

             CALCIUM (test code = CA) 10.3 MG/DL   8.5-10.1     H             





HEPATIC FUNCTION PANEL2019-10-28 20:40:00* 



             Test Item    Value        Reference Range Interpretation Comments

 

             TOTAL PROTEIN (test code = PROT) 8.7 G/DL     6.4-8.2      H       

      

 

             ALBUMIN (test code = ALB) 4.6 G/DL     3.4-5.0      N             

 

             BILIRUBIN TOTAL (test code = BILT) 0.40 MG/DL   0.2-1.2      N     

        

 

             BILIRUBIN DIRECT (test code = BILD) 0.10 MG/DL   0.00-0.30    N    

         

 

             BILIRUBIN INDIRECT (test code = BILIND) 0.30 MG/DL   0.2-1.2      N

             

 

             SGOT/AST (test code = AST) 15 Unit/L    15-37        N             

 

             SGPT/ALT (test code = ALT) 21 Unit/L    12-78        N             

 

             ALKALINE PHOSPHATASE TOTAL (test code = ALKP) 81 Unit/L      

     N             





LIPASE2019-10-28 20:40:00* 



             Test Item    Value        Reference Range Interpretation Comments

 

             LIPASE (test code = LIP) 339 Unit/L   114-286      H             





CBC W/AUTO DIFF2019-10-28 20:19:00* 



             Test Item    Value        Reference Range Interpretation Comments

 

             WHITE BLOOD CELL (test code = WBC) 8.9 K/mm3    3.5-11.0     N     

        

 

             RED BLOOD CELL (test code = RBC) 4.51 M/mm3   4.70-6.10    L       

      

 

             HEMOGLOBIN (test code = HGB) 14.4 G/DL    12.3-15.9    N           

  

 

             HEMATOCRIT (test code = HCT) 41.5 %       35.8-46.7    N           

  

 

             MEAN CELL VOLUME (test code = MCV) 92.0 Fl      86.3-98.9    N     

        

 

             MEAN CELL HGB (test code = MCH) 31.9 pg      28.9-34.4    N        

     

 

             MEAN CELL HGB CONCETRATION (test code = MCHC) 34.7 G/DL    32.1-34.

5    H             

 

             RED CELL DISTRIBUTION WIDTH (test code = RDW) 12.2 SD      11.5-14.

5    N             

 

             PLATELET COUNT (test code = PLT) 338.0 K/mm3  150-450      N       

      

 

             MEAN PLATELET VOLUME (test code = MPV) 8.60 fL      7.0-9.6      N 

            

 

             NEUTROPHIL % (test code = NT%) 53.2 %       40-76        N         

    

 

             LYMPHOCYTE % (test code = LY%) 33.7 %       20.5-51.1    N         

    

 

             MONOCYTE % (test code = MO%) 11.9 %       1.7-9.3      H           

  

 

             EOSINOPHIL % (test code = EO%) 1.1 %        0.0-6.0      N         

    

 

             BASOPHIL % (test code = BA%) 0.1 %        0.0-2.0      N           

  

 

             NEUTROPHIL # (test code = NT#) 4.72 K/mm3   1.8-7.6      N         

    

 

             LYMPHOCYTE # (test code = LY#) 3.0 K/mm3    0.6-3.0      N         

    

 

             MONOCYTE # (test code = MO#) 1.1 K/mm3    0.2-1.5      N           

  

 

             EOSINOPHIL # (test code = EO#) 0.1 K/mm3    0.0-0.4      N         

    

 

             BASOPHIL # (test code = BA#) 0.0 K/mm3    0.0-0.2      N           

  

 

             MANUAL DIFF REQUIRED (test code = MDIFF) NO DIFF/SCN  CRITERIA     

              





UA RFLX MICR CULT IF [id number] 19:11:00* 



             Test Item    Value        Reference Range Interpretation Comments

 

             UA COLOR (test code = COLU) YELLOW discript YEL/STRAW              

    

 

             UA APPEARANCE (test code = APPU) CLOUDY discript CLEAR        A    

         

 

             UA GLUCOSE DIPSTICK (test code = DGLUU) NEGATIVE mg/dL NEG         

               

 

             UA BILIRUBIN DIPSTICK (test code = BILU) 1+ mg/dL     NEG          

A             

 

             UA KETONE DIPSTICK (test code = KETU) 1+ mg/dL     NEG             

           

 

             UA SPECIFIC GRAVITY (test code = SGU) 1.020 SG     1.005-1.030     

           

 

             UA BLOOD DIPSTICK (test code = SERENA) 3+ mg/DL     NEG          A    

         

 

             UA PH DIPSTICK (test code = GIL) 6.5 pH UNITS 5.0-7.0              

      

 

             UA PROTEIN DIPSTICK (test code = PROU) 2+ mg/dL     NEG          A 

            

 

             UA UROBILINIOGEN DIPSTICK (test code = URO) 0.2 mg/dL    <2.0      

                 

 

             UA NITRITE DIPSTICK (test code = MELI) NEGATIVE SCREEN NEG         

               

 

             UA LEUKOCYTE ESTERASE DIPSTICK (test code = LEUU) 1+ Leuk/mcL  NEGA

TIVE     A             

 

             UA WBC (test code = WBCU) 10-20 #WBC/HPF 0-3          A            

 

 

             UA RBC (test code = RBCU) 10-20 #RBC/HPF 0-3          A            

 

 

             UA BACTERIA (test code = BACU) 1+ /HPF      NONE-TRACE   A         

    

 

             UA SQUAMOUS CELLS (test code = SQU) 1+ /HPF      NONE              

         

 

                    UA CULTURE NEEDED? (test code = UACULT) YES,WBC>10 & EPI<25 

Criteria Culture CHK

                                                     





SOURCE OF URINE: MIDSTREAMIndication for culture:     Suprapubic PainUA RFLX 
MICR CULT IF INDICATED2019-10-26 19:09:00* 



             Test Item    Value        Reference Range Interpretation Comments

 

             UA COLOR (test code = COLU) YELLOW discript YEL/STRAW              

    

 

             UA APPEARANCE (test code = APPU) CLOUDY discript CLEAR        A    

         

 

             UA GLUCOSE DIPSTICK (test code = DGLUU) NEGATIVE mg/dL NEG         

               

 

             UA BILIRUBIN DIPSTICK (test code = BILU) 1+ mg/dL     NEG          

A             

 

             UA KETONE DIPSTICK (test code = KETU) 1+ mg/dL     NEG             

           

 

             UA SPECIFIC GRAVITY (test code = SGU) 1.020 SG     1.005-1.030     

           

 

             UA BLOOD DIPSTICK (test code = SERENA) 3+ mg/DL     NEG          A    

         

 

             UA PH DIPSTICK (test code = GIL) 6.5 pH UNITS 5.0-7.0              

      

 

             UA PROTEIN DIPSTICK (test code = PROU) 2+ mg/dL     NEG          A 

            

 

             UA UROBILINIOGEN DIPSTICK (test code = URO) 0.2 mg/dL    <2.0      

                 

 

             UA NITRITE DIPSTICK (test code = MELI) NEGATIVE SCREEN NEG         

               

 

             UA LEUKOCYTE ESTERASE DIPSTICK (test code = LEUU) 1+ Leuk/mcL  NEGA

TIVE     A             

 

             UA CULTURE NEEDED? (test code = UACULT)  Criteria    Culture CHK   

             





SOURCE OF URINE: MIDSTREAMIndication for culture:     Suprapubic Pain- US 
ABDOMEN COMPLETE2019-10-26 17:59:00  Name: JAIDEN VILLASENOR                       
RONAN Shanks                      : 1985 Age/S: 34  / M         [id number] 
[address]              Unit #: [id number]     Loc:               [place]
[address]                Phys: Maricruz Anglin MD                                   
              Acct: [id number]  Dis Date:               Status: REG ER        
                         PHONE #: [phone]     Exam Date: 10/26/2019  1740  
                  FAX #:                   Reason: ruq ttp                      
                      EXAMS:                                               CPT: 
         [id number] US ABDOMEN COMPLETE                        [id number]             
      Ultrasound of the abdomen               CLINICAL INDICATION: Right upper 
quadrant flank pain               COMPARISON: None               Location B2    
          Liver is normal in size, contour and echogenicity without focal       
defects.               Common bile duct is normal in caliber measuring 4 mm.    
          Gallbladder is without calculi or wall thickening.               
Kidneys are normal in size without gross mass, hydronephrosis or       calculus.
 Right kidney measures 9.6 x 4.6 x 6.2 cm.  Left kidney       measures 10.6 x 
4.9 x 4.8 cm.               Visualized portions of the pancreas, inferior vena 
cava and aorta are       unremarkable.                 IMPRESSION: Normal 
ultrasound of the abdomen                   ** Electronically Signed by JOHN Marcano **           **              on 10/26/2019 at 1751             
**                      Reported and signed by: Erica Marcano M.D.          
      CC: Maricruz Anglin MD                                                     
                                                                 Technologist: 
Nadiya Storm                                           [facility] Date/Time: 
10/26/2019 (3894) tKALYAN                         PAGE  1                     
 Signed Report                                  Name: JAIDEN VILLASENOR Shanks                      : 1985 Age/S: 34  / M        
[id number]              Unit #: [id number]     Loc:               
[place] [address]                Phys: Maricruz Anglin MD                      
                           Acct: [id number]  Dis Date:               Status: 
REG ER                                  PHONE #: [phone]     Exam Date: 
10/26/2019  1740                     FAX #:                   Reason: ruq ttp   
                                         EXAMS:                                 
             CPT:           [id number] US ABDOMEN COMPLETE                       
[id number]               <Continued> Orig Print D/T: S: 10/26/2019 (8438)     Probe: 
                                                              PAGE  2           
           Signed Report                               - US ABDOMEN COMPLETE
2019-10-26 17:59:00  Name: JAIDEN VILLASENOR Shanks      
               : 1985 Age/S: 34  / M         [id number]        
     Unit #: [id number]     Loc:               [place] [address]               
Phys: Maricruz Anglin MD                                                  Acct: 
[id number]  Dis Date:               Status: [facility] ER                            
     PHONE #: [phone]     Exam Date: 10/26/2019  174                     
FAX #:                   Reason: ruq ttp                                        
    EXAMS:                                               CPT:           
[id number] US ABDOMEN COMPLETE                        [id number]                    
Ultrasound of the abdomen               CLINICAL INDICATION: Right upper 
quadrant flank pain               COMPARISON: None               Location B2    
          Liver is normal in size, contour and echogenicity without focal       
defects.               Common bile duct is normal in caliber measuring 4 mm.    
          Gallbladder is without calculi or wall thickening.               
Kidneys are normal in size without gross mass, hydronephrosis or       calculus.
 Right kidney measures 9.6 x 4.6 x 6.2 cm.  Left kidney       measures 10.6 x 
4.9 x 4.8 cm.               Visualized portions of the pancreas, inferior vena 
cava and aorta are       unremarkable.                 IMPRESSION: Normal 
ultrasound of the abdomen                   ** Electronically Signed by JOHN Marcano **           **              on 10/26/2019 at 1759             
**                      Reported and signed by: Erica Marcano M.D.          
      CC: Maricruz Anglin MD                                                     
                                                                 Technologist: 
Nadiya Storm                                           [facility] Date/Time: 
10/26/2019 (6088) Margarita                         PAGE  1                     
 Signed Report                                  Name: JAIDEN VILLASENORland                      : 1985 Age/S: 34  / M        
[id number]              Unit #: [id number]     Loc:               
[place] [address]                Phys: Maricruz Anglin MD                      
                           Acct: [id number]  Dis Date:               Status: 
DEP ER                                  PHONE #: [phone]     Exam Date: 
10/26/2019  1740                     FAX #:                   Reason: ruq ttp   
                                         EXAMS:                                 
             CPT:           [id number] US ABDOMEN COMPLETE                       
[id number]               <Continued> Orig Print D/T: S: 10/26/2019 (7761)     Probe: 
                                                              PAGE  2           
           Signed Report                               BASIC METABOLIC PANEL
2019-10-26 17:28:00* 



             Test Item    Value        Reference Range Interpretation Comments

 

             SODIUM (test code = NA) 140 mmol/L   134-147      N             

 

             POTASSIUM (test code = K) 3.2 mmol/L   3.4-5.0      L             

 

             CHLORIDE (test code = CL) 101 mmol/L   100-108      N             

 

             CARBON DIOXIDE (test code = CO2) 32 mmol/L    21-32        N       

      

 

             ANION GAP (test code = GAP) 7.0 GAP calc 4.0-15.0     N            

 

 

             GLUCOSE (test code = GLU) 84 MG/DL            N             

 

             BLOOD UREA NITROGEN (test code = BUN) 18 MG/DL     7-18         N  

           

 

             GLOMERULAR FILTRATION RATE (test code = GFR) >=60 max estimate estG

FR >60                        

 

             CREATININE (test code = CREAT) 1.0 MG/DL    0.8-1.3      N         

    

 

             CALCIUM (test code = CA) 9.5 MG/DL    8.5-10.1     N             





HEPATIC FUNCTION PANEL2019-10-26 17:28:00* 



             Test Item    Value        Reference Range Interpretation Comments

 

             TOTAL PROTEIN (test code = PROT) 8.2 G/DL     6.4-8.2      N       

      

 

             ALBUMIN (test code = ALB) 4.4 G/DL     3.4-5.0      N             

 

             BILIRUBIN TOTAL (test code = BILT) 0.80 MG/DL   0.2-1.2      N     

        

 

             BILIRUBIN DIRECT (test code = BILD) 0.20 MG/DL   0.00-0.30    N    

         

 

             BILIRUBIN INDIRECT (test code = BILIND) 0.60 MG/DL   0.2-1.2      N

             

 

             SGOT/AST (test code = AST) 23 Unit/L    15-37        N             

 

             SGPT/ALT (test code = ALT) 23 Unit/L    12-78        N             

 

             ALKALINE PHOSPHATASE TOTAL (test code = ALKP) 77 Unit/L      

     N             





LIPASE2019-10-26 17:28:00* 



             Test Item    Value        Reference Range Interpretation Comments

 

             LIPASE (test code = LIP) 65 Unit/L    114-286      L             





BASIC METABOLIC PANEL2019-10-26 17:24:00* 



             Test Item    Value        Reference Range Interpretation Comments

 

             SODIUM (test code = NA) 140 mmol/L   134-147      N             

 

             POTASSIUM (test code = K) 3.2 mmol/L   3.4-5.0      L             

 

             CHLORIDE (test code = CL) 101 mmol/L   100-108      N             

 

             CARBON DIOXIDE (test code = CO2) 32 mmol/L    21-32        N       

      

 

             ANION GAP (test code = GAP) 7.0 GAP calc 4.0-15.0     N            

 

 

             GLUCOSE (test code = GLU) 84 MG/DL            N             

 

             BLOOD UREA NITROGEN (test code = BUN) 18 MG/DL     7-18         N  

           

 

             GLOMERULAR FILTRATION RATE (test code = GFR)  estGFR      >60      

                  

 

             CREATININE (test code = CREAT)  MG/DL       0.8-1.3                

    

 

             CALCIUM (test code = CA) 9.5 MG/DL    8.5-10.1     N             





HEPATIC FUNCTION PANEL2019-10-26 17:24:00* 



             Test Item    Value        Reference Range Interpretation Comments

 

             TOTAL PROTEIN (test code = PROT)  G/DL        6.4-8.2              

      

 

             ALBUMIN (test code = ALB)  G/DL        3.4-5.0                    

 

             BILIRUBIN TOTAL (test code = BILT)  MG/DL       0.2-1.2            

        

 

             BILIRUBIN DIRECT (test code = BILD)  MG/DL       0.00-0.30         

         

 

             BILIRUBIN INDIRECT (test code = BILIND)  MG/DL       0.2-1.2       

             

 

             SGOT/AST (test code = AST)  Unit/L      15-37                      

 

             SGPT/ALT (test code = ALT)  Unit/L      12-78                      

 

             ALKALINE PHOSPHATASE TOTAL (test code = ALKP)  Unit/L        

                   





LIPASE2019-10-26 17:24:00* 



             Test Item    Value        Reference Range Interpretation Comments

 

             LIPASE (test code = LIP) 65 Unit/L    114-286      L             





CBC W/AUTO DIFF2019-10-26 17:11:00* 



             Test Item    Value        Reference Range Interpretation Comments

 

             WHITE BLOOD CELL (test code = WBC) 9.0 K/mm3    3.5-11.0     N     

        

 

             RED BLOOD CELL (test code = RBC) 4.18 M/mm3   4.70-6.10    L       

      

 

             HEMOGLOBIN (test code = HGB) 13.4 G/DL    12.3-15.9    N           

  

 

             HEMATOCRIT (test code = HCT) 39.8 %       35.8-46.7    N           

  

 

             MEAN CELL VOLUME (test code = MCV) 95.2 Fl      86.3-98.9    N     

        

 

             MEAN CELL HGB (test code = MCH) 32.1 pg      28.9-34.4    N        

     

 

             MEAN CELL HGB CONCETRATION (test code = MCHC) 33.7 G/DL    32.1-34.

5    N             

 

             RED CELL DISTRIBUTION WIDTH (test code = RDW) 12.3 SD      11.5-14.

5    N             

 

             PLATELET COUNT (test code = PLT) 302.0 K/mm3  150-450      N       

      

 

             MEAN PLATELET VOLUME (test code = MPV) 8.80 fL      7.0-9.6      N 

            

 

             NEUTROPHIL % (test code = NT%) 53.2 %       40-76        N         

    

 

             LYMPHOCYTE % (test code = LY%) 35.2 %       20.5-51.1    N         

    

 

             MONOCYTE % (test code = MO%) 10.9 %       1.7-9.3      H           

  

 

             EOSINOPHIL % (test code = EO%) 0.6 %        0.0-6.0      N         

    

 

             BASOPHIL % (test code = BA%) 0.1 %        0.0-2.0      N           

  

 

             NEUTROPHIL # (test code = NT#) 4.77 K/mm3   1.8-7.6      N         

    

 

             LYMPHOCYTE # (test code = LY#) 3.2 K/mm3    0.6-3.0      H         

    

 

             MONOCYTE # (test code = MO#) 1.0 K/mm3    0.2-1.5      N           

  

 

             EOSINOPHIL # (test code = EO#) 0.1 K/mm3    0.0-0.4      N         

    

 

             BASOPHIL # (test code = BA#) 0.0 K/mm3    0.0-0.2      N           

  

 

             MANUAL DIFF REQUIRED (test code = MDIFF) NO DIFF/SCN  CRITERIA     

              





CHE[facility] PAN[facility]2019-10-26 09:44[facility]0073[facility] [place] 09:44:0019MH 
[facility]2019-10-26 09:44:000.90MH [facility] [place]-10-26 
09:44:43902QM [facility] [place]2019-10-26 09:44:003.7 [facility]
2019-10-26 09:44:84570RY [facility] [place]2019-10-26 09:44:0030MH [facility]
CHEM [facility] [place]2019-10-26 09:44:0010.7[facility] [place]2019-10-26 09:44:009.3MH 
[facility] [place]2019-10-26 09:44:07668JV [facility] [place]2019-10-25 
15:32:0084MH [facility] [place]2019-10-25 15:32:0027MH [facility]
2019-10-25 15:32:001.10 [facility]2019-10-25 15:32:59311OO [facility]
CHEM [facility] [place]2019-10-25 15:32:003.4MH [facility]2019-10-25 15:32:01449CR 
[facility]2019-10-25 15:32:0027MH [facility] [place]2019-10-25 
15:32:0010.4[facility]2019-[facility] 15:32:007.9MH [facility]
2019-10-25 15:32:00* 



             Test Item    Value        Reference Range Interpretation Comments

 

             B/C Ratio (test code = B/C Ratio) 25 1         6-25                

       





[facility] 15:32:006.2[facility]2019-10-25 
15:32:003.6M[facility] HospitalCHE[facility] PANEL2019-10-25 15:32:002.6M[facility] HospitalCHEM PANEL
2019-10-25 15:32:00* 



             Test Item    Value        Reference Range Interpretation Comments

 

             A/G Ratio (test code = A/G Ratio) 1.4 1        0.7-1.6             

       





[facility] 15:32:0018[facility]2019-10-25 
15:32:0015[facility]2019-10-25 15:32:0062[facility]
2019-10-25 15:32:000.6M[facility] HospitalCHEM PANEL2019-10-25 15:32:72451GNS[facility] Hospital
CHEM PANEL2019-10-25 15:32:57678TFSutter Coast HospitalCHEM PANEL2019-10-25 15:32:77804HT 
OdlzjxngzETVPJCSHZF5794-56-24 09:49:0010.7[facility] HospitalMwkyhsjaaBBDSDPNDGK8762-44-40 
09:49:004.05[facility] HospitalXrhwrpnzcSURLXRCJEA5203-17-53 09:49:0013.2M[facility] HospitalHEMATOLOGY
2019-10-25 09:49:0039.4[facility] HospitalVocmkkvqvUMSLBITZOZ8727-51-47 09:49:0097.4[facility]
HEMATOLOGY2019-10-25 09:49:00* 



             Test Item    Value        Reference Range Interpretation Comments

 

             MCH (test code = MCH) 32.7 pg      27.0-31.0                  





[facility] HospitalEyjphefvhVKOYHJPOYE3565-26-10 09:49:0033.5[facility] HospitalZvsuioxtmPUDHQOPLGR2338-67-48 
09:49:0012.9[facility] HospitalTpkvoqswjMJCHKCXCLM1543-58-63 09:49:26878RZ [facility]HEMAT[facility]
2019-10-25 09:49:007.4[facility]DR[facility]CREEN2019-10-25 02:19:00Negative 
*NA*(10/24/19 9:19 PM)[facility]2019-10-25 02:19:00Negative 
*NA*(10/24/19 9:19 PM)[facility]2019-10-25 02:19:00Negative 
*NA*(10/24/19 9:19 PM)[facility]2019-10-25 02:19:00Positive 
*ABN*(10/24/19 9:19 PM)[facility]2019-10-25 02:19:00Negative 
*NA*(10/24/19 9:19 PM)[facility]2019-10-25 02:19:00Positive 
*ABN*(10/24/19 9:19 PM)[facility]2019-10-25 02:19:00Negative 
*NA*(10/24/19 9:19 PM)[facility]2019-10-25 02:19:00See Note 
(10/24/19 9:19 PM) [facility]2019-10-25 02:19:00Moderate 
*ABN*(10/24/19 9:19 PM) [facility]2019-10-25 02:19:00* 



             Test Item    Value        Reference Range Interpretation Comments

 

             UA Spec Grav (test code = UA Spec Grav) 1.021 1                    

             





 [facility]2019-10-25 02:19:00* 



             Test Item    Value        Reference Range Interpretation Comments

 

             UA pH (test code = UA pH) 5.0 1        5.0-8.0                    





 [facility]2019-10-25 02:19:00Negative *NA*(10/24/19 9:19 PM) 
[facility]2019-10-25 02:19:00Large *ABN*(10/24/19 9:19 PM) 
[facility]2019-10-25 02:19:002.0 [facility]
2019-10-25 02:19:00Negative (10/24/19 9:19 PM) [facility]
2019-10-25 02:19:00Moderate *ABN*(10/24/19 9:19 PM) [facility]
2019-10-25 02:19:0063 [facility]2019-10-25 02:19:99226MI 
[facility]2019-10-25 02:19:0052 [facility]CHEM PANEL2019-10-25 
00:16:200223BD [facility]CHEM PANEL2019-10-25 00:16:28720LP SouthwestCHEM PANEL
2019-10-25 00:16:008.1MH [facility]CHEM PANEL2019-10-25 00:16:004.5[facility]
CHEM PANEL2019-10-25 00:16:0023 [facility]CHE[facility] PANEL2019-10-25 00:16:0017 
[facility]CHEM PANEL2019-10-25 00:16:0086 [facility] PANEL2019-10-25 
00:16:000.9 [facility] PANEL2019-10-25 00:16:000.2MH [facility]CHE[facility] PANEL
2019-10-25 00:16:000.7 [facility]CHE[facility] PANEL2019-10-25 00:16:003.6MH [facility]
CHEM PANEL2019-10-25 00:16:00* 



             Test Item    Value        Reference Range Interpretation Comments

 

             A/G Ratio (test code = A/G Ratio) 1.2 1        0.7-1.6             

       





 RfruopsxzTYAAOACJOB0709-41-85 00:16:0011.1M[facility] HospitalFrgdgqtedVGGPJYNMVT7922-23-26 
00:16:004.50 RlxjdqjkaZIQFHOJCKN4397-06-89 00:16:0014.5 [facility]
2019-10-25 00:16:0043.7 HujjfpsspMEKQCCFAHB3577-41-73 00:16:0097.2[facility]
FXIHNVMMOA6479-48-34 00:16:00* 



             Test Item    Value        Reference Range Interpretation Comments

 

             MCH (test code = MCH) 32.2 pg      27.0-31.0                  





 NlqoqqpcmXGXZQWZARQ8515-31-16 00:16:0033.1M FpfhhjpmaUJJJIOBKQN3654-61-16 
00:16:0013.0 AypqgggjbMMZAUZQTSE8790-99-04 00:16:29562YR [facility]
2019-10-25 00:16:006.9 [facility]2019-10-25 00:16:00Normal (10/24/19 
7:16 PM)[facility] 00:16:00Moderate *ABN*(10/24/19 7:16 
PM)[facility]2019-10-25 00:16:0077.2MWood[facility] HospitalLvzxovnmoEYIVVCPPJQ4170-55-93
00:16:0016.2MWood[facility] HospitalLqyqslferIYSHMRFDXV8262-61-23 00:16:006.1M[facility] HospitalHEMATOLOGY
2019-10-25 00:16:000.2MH [facility]2019-10-25 00:16:000.3[facility]
ENFHAOGXXB3771-22-44 00:16:008.6MH ZnzsprudeDYEBPAKYAD2896-72-26 00:16:001.8[facility] HospitalAvvukebezPKHIEHGGDN5084-64-24 00:16:000.7 [facility]2019-10-25 
00:16:000.0 [facility]HEMATOLOGY2019-10-25 00:16:000.0 SouthwestIMMUNOLOGY
2019-10-25 00:16:00Negative (10/24/19 7:16 PM) [facility]LIPIDS2019-10-25 
00:16:0065[facility] HospitalJgqvdfbaoSGEBNF0062-98-45 00:16:14114BXSutter Coast HospitalCsybfjjwsMENNAH1613-71-24 
00:16:0039[facility] HospitalDtlgsuceqBLUDNW4166-82-77 00:16:12693LSSutter Coast HospitalLIPIDS2019-10-25 
00:16:00* 



             Test Item    Value        Reference Range Interpretation Comments

 

             VLDL (test code = VLDL) 13 1                                    





[facility]2019-[facility] 00:16:00* 



             Test Item    Value        Reference Range Interpretation Comments

 

             CHD Risk (test code = CHD Risk) 4.77 1       4.00-7.30             

     





 SouthwestCARDIAC AAKYTUE1737-93-77 17:59:56560DP SouthwestCHEM PANEL
2019-10-24 17:59:66043QW SouthwestCHEM PANEL2019-10-24 17:59:0026MH [facility]
CHEM CNGZX5032-06-51 17:59:002.60 [facility]CHEM PANEL2019-10-24 17:59:35613EP 
SouthwestCHEM PANEL2019-10-24 17:59:003.3M SouthwestCHEM PANEL2019-10-24 
17:59:0096MH SouthwestCHEM WGWZP8746-95-55 17:59:0025MH SouthwestCHEM PANEL
2019-10-24 17:59:0011.7 SouthwestCHEM IZHZH4046-20-16 17:59:0036MH [facility]
CHEM PANEL2019-10-24 17:59:0016.[facility] GlincvcuqMJTZFGMTUK6536-23-58 17:59:007MH 
JiffhcjurOYBSGMCZSW2879-81-85 17:59:0010.5[facility] HospitalTutrrhkmxWOACCGJLGI4670-37-98 
17:59:005.45[facility] HospitalVpdnwpaayGRMNQFCETM0660-40-69 17:59:0017.7[facility] HospitalHEMATOLOGY
2019-10-24 17:59:0053.5[facility] HospitalDjbbdshcqFHYMTLXXAB1937-94-21 17:59:0098.2MH [facility]
XTMFFSHVDE1572-48-93 17:59:00* 



             Test Item    Value        Reference Range Interpretation Comments

 

             MCH (test code = MCH) 32.4 pg      27.0-31.0                  





[facility] HospitalOlleonygnCFOKWOYDED4891-65-41 17:59:0033.0[facility] HospitalJsdidqvfwVPJQAHLCDC1223-04-77 
17:59:0013.1M[facility] HospitalGuctxkjywNCTDLFMSKK5955-48-85 17:59:50282OOSutter Coast HospitalHEMATOLOGY
2019-10-24 17:59:007.2MH YzperkxgeZJDXDKHYVW3967-78-50 17:59:0072.3MH [facility]
NMMBEQTFPW6260-96-93 17:59:0020.5[facility] HospitalZzimhenluXEPZQDKTHR0176-52-92 17:59:006.4[facility] HospitalBuctzwyisAZUFKHSDPK9989-96-72 17:59:000.3MH EgekebljkOEDVYVPTYL4213-75-70 
17:59:000.5[facility] HospitalRalwicrjzKXTGSYGHLX5438-44-97 17:59:007.6M[facility] HospitalHEMATOLOGY
2019-10-24 17:59:002.2MH IpscgugvvTSUQYCYBBD7634-18-06 17:59:000.7[facility]
JRTSPHEJVC0543-94-75 17:59:000.0[facility] HospitalTcegmcbljBERPADQQXB3964-21-20 17:59:000.1MH 
[facility] HospitalUA RFLX MICR CULT IF INDICATED2019-10-18 18:03:00* 



             Test Item    Value        Reference Range Interpretation Comments

 

             UA COLOR (test code = COLU) RED discript YEL/STRAW    A            

ABNORMAL URINE COLOR MAY 

CAUSE INVALID RESUTS DUE TO COLORINTERFERENCE; MICROSCOPIC EXAM PERFORMED

 

             UA APPEARANCE (test code = APPU) TURBID discript CLEAR        A    

         

 

             UA GLUCOSE DIPSTICK (test code = DGLUU) NEGATIVE mg/dL NEG         

               

 

             UA BILIRUBIN DIPSTICK (test code = BILU) 2+ mg/dL     NEG          

A             

 

             UA KETONE DIPSTICK (test code = KETU) 1+ mg/dL     NEG             

           

 

             UA SPECIFIC GRAVITY (test code = SGU) 1.025 SG     1.005-1.030     

           

 

             UA BLOOD DIPSTICK (test code = SERENA) 3+ mg/DL     NEG          A    

         

 

             UA PH DIPSTICK (test code = GIL) 6.5 pH UNITS 5.0-7.0              

      

 

             UA PROTEIN DIPSTICK (test code = PROU) 3+ mg/dL     NEG          A 

            

 

             UA UROBILINIOGEN DIPSTICK (test code = URO) 4.0 mg/dL    <2.0      

   A             

 

             UA NITRITE DIPSTICK (test code = MELI) POSITIVE SCREEN NEG         

 A             

 

             UA LEUKOCYTE ESTERASE DIPSTICK (test code = LEUU) 2+ Leuk/mcL  NEGA

TIVE     A             

 

             UA WBC (test code = WBCU) 0-1 #WBC/HPF 0-3                        

 

             UA RBC (test code = RBCU) TNTC #RBC/HPF 0-3          A             

 

             UA BACTERIA (test code = BACU) NONE SEEN /HPF NONE-TRACE           

      

 

             UA SQUAMOUS CELLS (test code = SQU) TRACE /HPF   NONE              

         

 

             UA MUCUS (test code = MUCU) 1+ /LPF      NONE SEEN                 

 

 

             UA CULTURE NEEDED? (test code = UACULT) NO, WBC<10 Criteria Culture

 CHK                





SOURCE OF URINE: CLEAN CATCHIndication for culture:     Dysuria/Frequency
COMPREHENSIVE METABOLIC PANEL2019-10-18 17:57:00* 



             Test Item    Value        Reference Range Interpretation Comments

 

             SODIUM (test code = NA) 140 mmol/L   134-147      N             

 

             POTASSIUM (test code = K) 3.5 mmol/L   3.4-5.0      N             

 

             CHLORIDE (test code = CL) 105 mmol/L   100-108      N             

 

             CARBON DIOXIDE (test code = CO2) 28 mmol/L    21-32        N       

      

 

             ANION GAP (test code = GAP) 7.0 GAP calc 4.0-15.0     N            

 

 

             GLUCOSE (test code = GLU) 89 MG/DL            N             

 

             BLOOD UREA NITROGEN (test code = BUN) 16 MG/DL     7-18         N  

           

 

             GLOMERULAR FILTRATION RATE (test code = GFR) >=60 max estimate estG

FR >60                        

 

             CREATININE (test code = CREAT) 0.8 MG/DL    0.8-1.3      N         

    

 

             TOTAL PROTEIN (test code = PROT) 7.3 G/DL     6.4-8.2      N       

      

 

             ALBUMIN (test code = ALB) 4.1 G/DL     3.4-5.0      N             

 

             GLOBULIN (test code = GLOB) 3.2 GM/dL                              

 

 

             ALBUMIN/GLOBULIN RATIO (test code = A/G) 1.3 RATIO    1.2-2.2      

N             

 

             CALCIUM (test code = CA) 8.8 MG/DL    8.5-10.1     N             

 

             BILIRUBIN TOTAL (test code = BILT) 0.30 MG/DL   0.2-1.2      N     

        

 

             SGOT/AST (test code = AST) 12 Unit/L    15-37        L             

 

             SGPT/ALT (test code = ALT) 17 Unit/L    12-78        N             

 

             ALKALINE PHOSPHATASE TOTAL (test code = ALKP) 72 Unit/L      

     N             





UA RFLX MICR CULT IF INDICATED2019-10-18 17:48:00* 



             Test Item    Value        Reference Range Interpretation Comments

 

             UA COLOR (test code = COLU) RED discript YEL/STRAW    A            

ABNORMAL URINE COLOR MAY 

CAUSE INVALID RESUTS DUE TO COLORINTERFERENCE; MICROSCOPIC EXAM PERFORMED

 

             UA APPEARANCE (test code = APPU) TURBID discript CLEAR        A    

         

 

             UA GLUCOSE DIPSTICK (test code = DGLUU) NEGATIVE mg/dL NEG         

               

 

             UA BILIRUBIN DIPSTICK (test code = BILU) 2+ mg/dL     NEG          

A             

 

             UA KETONE DIPSTICK (test code = KETU) 1+ mg/dL     NEG             

           

 

             UA SPECIFIC GRAVITY (test code = SGU) 1.025 SG     1.005-1.030     

           

 

             UA BLOOD DIPSTICK (test code = SERENA) 3+ mg/DL     NEG          A    

         

 

             UA PH DIPSTICK (test code = GIL) 6.5 pH UNITS 5.0-7.0              

      

 

             UA PROTEIN DIPSTICK (test code = PROU) 3+ mg/dL     NEG          A 

            

 

             UA UROBILINIOGEN DIPSTICK (test code = URO) 4.0 mg/dL    <2.0      

   A             

 

             UA NITRITE DIPSTICK (test code = MELI) POSITIVE SCREEN NEG         

 A             

 

             UA LEUKOCYTE ESTERASE DIPSTICK (test code = LEUU) 2+ Leuk/mcL  NEGA

TIVE     A             

 

             UA CULTURE NEEDED? (test code = UACULT)  Criteria    Culture CHK   

             





SOURCE OF URINE: CLEAN CATCHIndication for culture:     Dysuria/FrequencyCBC 
W/AUTO DIFF2019-10-18 17:43:00* 



             Test Item    Value        Reference Range Interpretation Comments

 

             WHITE BLOOD CELL (test code = WBC) 7.6 K/mm3    3.5-11.0     N     

        

 

             RED BLOOD CELL (test code = RBC) 4.06 M/mm3   4.70-6.10    L       

      

 

             HEMOGLOBIN (test code = HGB) 13.3 G/DL    12.3-15.9    N           

  

 

             HEMATOCRIT (test code = HCT) 38.1 %       35.8-46.7    N           

  

 

             MEAN CELL VOLUME (test code = MCV) 93.8 Fl      86.3-98.9    N     

        

 

             MEAN CELL HGB (test code = MCH) 32.8 pg      28.9-34.4    N        

     

 

             MEAN CELL HGB CONCETRATION (test code = MCHC) 34.9 G/DL    32.1-34.

5    H             

 

             RED CELL DISTRIBUTION WIDTH (test code = RDW) 12.7 SD      11.5-14.

5    N             

 

             PLATELET COUNT (test code = PLT) 290.0 K/mm3  150-450      N       

      

 

             MEAN PLATELET VOLUME (test code = MPV) 8.90 fL      7.0-9.6      N 

            

 

             NEUTROPHIL % (test code = NT%) 54.4 %       40-76        N         

    

 

             LYMPHOCYTE % (test code = LY%) 35.7 %       20.5-51.1    N         

    

 

             MONOCYTE % (test code = MO%) 8.4 %        1.7-9.3      N           

  

 

             EOSINOPHIL % (test code = EO%) 1.2 %        0.0-6.0      N         

    

 

             BASOPHIL % (test code = BA%) 0.3 %        0.0-2.0      N           

  

 

             NEUTROPHIL # (test code = NT#) 4.13 K/mm3   1.8-7.6      N         

    

 

             LYMPHOCYTE # (test code = LY#) 2.7 K/mm3    0.6-3.0      N         

    

 

             MONOCYTE # (test code = MO#) 0.6 K/mm3    0.2-1.5      N           

  

 

             EOSINOPHIL # (test code = EO#) 0.1 K/mm3    0.0-0.4      N         

    

 

             BASOPHIL # (test code = BA#) 0.0 K/mm3    0.0-0.2      N           

  

 

             MANUAL DIFF REQUIRED (test code = MDIFF) NO DIFF/SCN  CRITERIA     

              





- CT ABD PELVIS W/O CONT2019-10-18 17:38:00  Name: JAIDEN VILLASENOR Shanks                      : 1985 Age/S: 34  / M         
[id number]              Unit #: [id number]     Loc:               
[place] [address]                Phys: Paulo Braxton MD                  
                           Acct: [id number]  Dis Date:               Status: 
REG ER                                  PHONE #: [phone]     Exam Date: 
10/18/2019  1722                     FAX #:                   Reason: hematuria 
                                         EXAMS:                                 
             CPT:           [id number] CT ABD PELVIS W/O CONT                    
[id number]                            EXAM: CT abdomen and pelvis without contrast   
           Dictation location: B2               INDICATION: Hematuria, painful 
urination               COMPARISON: CT abdomen and pelvis on 2017          
    TECHNIQUE: Axial images of the abdomen and pelvis were obtained       
without contrast. Coronal and sagittal reformatted images were       performed. 
             DISCUSSION:               Lower thorax: Subtle centrilobular 
groundglass nodules are seen       throughout the lung bases, similar compared 
to the previous exam on       2017.               Hepatobiliary:  
Unremarkable. No biliary ductal dilatation.               Gallbladder: 
Unremarkable.               Spleen:  Unremarkable.               Pancreas:  Unr
emarkable.               Kidneys:  A 2 mm nonobstructive upper pole left renal c
alculus is seen       on coronal image 48.  A punctate interpolar right renal ca
lculus is       seen on coronal image 49.  No definite ureteral calculus is seen
.  No       hydronephrosis or gross evidence of solid renal mass is identified. 
             Adrenals:  Unremarkable.               Lymph nodes: No lymphadenop
athy.               Peritoneum/retroperitoneum: No intraabdominal free air or fr
ee fluid.               Vessels: Mild to moderate atherosclerotic calcification,
greater than       expected for the patient's age.  No abdominal aortic aneurys
m.               Pelvic organs/bladder:  The bladder is decompressed.  The prost
ate       gland is unremarkable.               Bowel: No abnormal bowel wall thi
ckening or bowel obstruction is seen.     PAGE  1                       Signed R
[place]                    (CONTINUED)   Name: JAIDEN VILLASENOR Shanks                      : 1985 Age/S: 34  / M         [id number]
[facility]              Unit #: [id number]     Loc:               [place], [place] 
[address]                Phys: Paulo Braxton MD                               
              Acct: [id number]  Dis Date:               Status: REG ER        
                         PHONE #: [phone]     Exam Date: 10/18/2019  172  
                  FAX #:                   Reason: hematuria                    
                      EXAMS:                                               CPT: 
         [id number] CT ABD PELVIS W/O CONT                     [id number]             
 <Continued>         The appendix is normal.               Bones/soft tissues: 
No fracture or evidence of bony neoplastic       process.  No hernia is seen.   
             IMPRESSION:                   1. Small nonobstructive bilateral 
renal calculi.  No ureteral calculus         or hydronephrosis is seen.  The 
appendix is normal.         2.  Mild to moderate atherosclerotic calcification, 
somewhat greater         than expected for the patient's age.  Correlation with 
clinical data         is needed.         3.  Subtle centrilobular nodules 
throughout the visualized lungs,         similar compared to the previous exam. 
This could be seen with         hypersensitivity pneumonitis or due to other 
inflammatory process.          Correlation with smoking or environmental 
exposure history is         suggested.  If the patient is symptomatic or there 
is a positive risk         factor, outpatient high-resolution CT chest follow-up
may be         beneficial.                                       One or more of 
the following dose reduction techniques were used:         Automated exposure 
control, adjustment of the mA and/or kV according         to patient size, 
and/or utilization of iterative reconstruction         technique.         DLP:  
281 mGy-cm CTDI: 6  mGy                              ** Electronically Signed by
JOHN Ballesteros **            **            on 10/18/2019 at 173            
**                      Reported and signed by: Fitz Ballesteros M.D.          CC:
Laura E Laase PA; Paulo Braxton MD                                         
                                                       Technologist:RT Leger (R)(CT); Yimi   CTDI:        DLP:        Trnscb Date/Time: 10/18/2019
(7808) t.SDR.BC0                        Orig Print D/T: S: 10/18/2019 (0414)    
PAGE  2                       Signed Report                               - CT 
ABD PELVIS W/O CONT2019-10-18 17:38:00  Name: JAIDEN VILLASENORland                      : 1985 Age/S: 34  / M         [id number] 
[address]              Unit #: [id number]     Loc:               [place]
[address]                Phys: Paulo Braxton MD                               
              Acct: [id number]  Dis Date:               Status: [facility] ER        
                         PHONE #: [phone]     Exam Date: 10/18/2019  1729  
                  FAX #:                   Reason: hematuria                    
                      EXAMS:                                               CPT: 
         [id number] CT ABD PELVIS W/O CONT                     [address]             
              EXAM: CT abdomen and pelvis without contrast               
Dictation location: B2               INDICATION: Hematuria, painful urination   
           COMPARISON: CT abdomen and pelvis on 2017               
TECHNIQUE: Axial images of the abdomen and pelvis were obtained       without 
contrast. Coronal and sagittal reformatted images were       performed.         
     DISCUSSION:               Lower thorax: Subtle centrilobular groundglass 
nodules are seen       throughout the lung bases, similar compared to the 
previous exam on       2017.               Hepatobiliary:  Unremarkable. No
biliary ductal dilatation.               Gallbladder: Unremarkable.             
 Spleen:  Unremarkable.               Pancreas:  Unremarkable.               
Kidneys:  A 2 mm nonobstructive upper pole left renal calculus is seen       on 
coronal image 48.  A punctate interpolar right renal calculus is       seen on 
coronal image 49.  No definite ureteral calculus is seen.  No       
hydronephrosis or gross evidence of solid renal mass is identified.             
 Adrenals:  Unremarkable.               Lymph nodes: No lymphadenopathy.        
      Peritoneum/retroperitoneum: No intraabdominal free air or free fluid.     
         Vessels: Mild to moderate atherosclerotic calcification, greater than  
    expected for the patient's age.  No abdominal aortic aneurysm.              
Pelvic organs/bladder:  The bladder is decompressed.  The prostate       gland 
is unremarkable.               Bowel: No abnormal bowel wall thickening or bowel
obstruction is seen.     PAGE  1                       Signed Report            
       (CONTINUED)   Name: JAIDEN VILLASENOR      
               : 1985 Age/S: 34  / M         [address] [address]        
     Unit #: [id number]     Loc:               [place] [address]               
Phys: Paulo Braxton MD                                              Acct: 
[id number]  Dis Date:               Status: DEP ER                            
     PHONE #: [phone]     Exam Date: 10/18/2019  1722                     
FAX #:                   Reason: hematuria                                      
    EXAMS:                                               CPT:           
[id number] CT ABD PELVIS W/O CONT                     [address]               <
Continued>         The appendix is normal.               Bones/soft tissues: No 
fracture or evidence of bony neoplastic       process.  No hernia is seen.      
          IMPRESSION:                   1. Small nonobstructive bilateral renal 
calculi.  No ureteral calculus         or hydronephrosis is seen.  The appendix 
is normal.         2.  Mild to moderate atherosclerotic calcification, somewhat 
greater         than expected for the patient's age.  Correlation with clinical 
data         is needed.         3.  Subtle centrilobular nodules throughout the 
visualized lungs,         similar compared to the previous exam.  This could be 
seen with         hypersensitivity pneumonitis or due to other inflammatory 
process.          Correlation with smoking or environmental exposure history is 
       suggested.  If the patient is symptomatic or there is a positive risk    
    factor, outpatient high-resolution CT chest follow-up may be         
beneficial.                                       One or more of the following 
dose reduction techniques were used:         Automated exposure control, 
adjustment of the mA and/or kV according         to patient size, and/or 
utilization of iterative reconstruction         technique.         DLP:  281 
mGy-cm CTDI: 6  mGy                              ** Electronically Signed by 
JOHN Ballesteros **            **            on 10/18/2019 at 1738            
**                      Reported and signed by: Fitz Ballesteros M.D.          CC:
Laura E Laase PA; Paulo Braxton MD                                         
                                                       Technologist:RT [place](R)(CT); Yimi   CTDI:        DLP:        Trnscb Date/Time: 10/18/2019
(1738) [id number]                        Orig Print D/T: S: 10/18/2019 (0971)    
PAGE  2                       Signed Report                               
URINALYSIS [id number] 11:12:00* 



             Test Item    Value        Reference Range Interpretation Comments

 

             UA COLOR (test code = COLU) YELLOW       YELLOW                    

 

 

             UA APPEARANCE (test code = APPU) CLEAR        CLEAR                

      

 

             UA GLUCOSE DIPSTICK (test code = DGLUU) NORMAL MG/DL NORMAL        

             

 

             UA BILIRUBIN DIPSTICK (test code = BILU) NEGATIVE MG/DL NEGATIVE   

                

 

             UA KETONE DIPSTICK (test code = KETU) 5 MG/DL      NEGATIVE        

           

 

             UA SPECIFIC GRAVITY (test code = SGU) 1.020        1.003-1.030  N  

           

 

             UA BLOOD DIPSTICK (test code = SERENA) 10 Min/mm3   NEGATIVE     A    

         

 

             UA PH DIPSTICK (test code = GIL) 5.0          5.0-9.0      N       

      

 

             UA PROTEIN DIPSTICK (test code = PROU) 30 MG/DL     NEGATIVE     A 

            

 

             UA UROBILINIOGEN DIPSTICK (test code = URO) NORMAL MG/DL NORMAL    

                 

 

             UA NITRITE DIPSTICK (test code = MELI) NEGATIVE     NEGATIVE       

            

 

             UA LEUKOCYTE ESTERASE DIPSTICK (test code = LEUU) TRACE /mm3   NEGA

TIVE     A             

 

             UA CULTURE NEEDED? (test code = UACULT) NO, WBC<10 Criteria Culture

 Chk                





UA [id number] 11:12:00* 



             Test Item    Value        Reference Range Interpretation Comments

 

             UA RBC (test code = RBCU) 0-3 RBC/HPF  0-3                        

 

             UA WBC (test code = XWBCU) 3-5 WBC/HPF  0-5                        

 

             UA EPITHELIAL CELLS (test code = EPIU) FEW EPI/HPF  FEW            

            

 

             UA BACTERIA (test code = XBACU) RARE         NONE                  

     





DRUGS OF ABUSE SCREEN [id number] 11:12:00* 



             Test Item    Value        Reference Range Interpretation Comments

 

             UR COCAINE (test code = COCAU) NEGATIVE     NEGATIVE               

   Cut off Value: 300 ng/mL

 

             UR CANNABINOIDS (THC) (test code = CANU) POSITIVE     NEGATIVE     

             Cut off Value: 50 

ng/mL

 

             UR AMPHETAMINE (test code = AMPHU) NEGATIVE     NEGATIVE           

       Cut off Value: 1000 ng/mL

 

             UR BARBITURATE QUAL (test code = BARBQLU) NEGATIVE     NEGATIVE    

              Cut off Value: 200

ng/mL

 

             UR BENZODIAZEPINE (test code = BENZU) NEGATIVE     NEGATIVE        

          Cut off Value: 200 

ng/mL

 

             UR OPIATES QUAL (test code = OPIAQLU) NEGATIVE     NEGATIVE        

          Cut off Value: 300 

ng/mL

 

             UR PHENCYCLIDINE (PCP) (test code = PHENCU) NEGATIVE     NEGATIVE  

                Cut off Value: 

25 ng/mL





COMPREHENSIVE METABOLIC [id number] 10:32:00* 



             Test Item    Value        Reference Range Interpretation Comments

 

             SODIUM (test code = NA) 135 MMOL/L   137-145      L             

 

             POTASSIUM (test code = K) 3.6 MMOL/L   3.5-5.1      N             

 

             CHLORIDE (test code = CL) 89 MMOL/L           L             

 

             CARBON DIOXIDE (test code = CO2) 28 MMOL/L    22-30        N       

      

 

             ANION GAP (test code = GAP) 22 MMOL/L    14-24        N            

 

 

             GLUCOSE (test code = GLU) 95 MG/DL            N             

 

             BLOOD UREA NITROGEN (test code = BUN) 37 MG/DL     9-20         H  

           

 

             GLOMERULAR FILTRATION RATE (test code = GFR) 45                    

                 Reporting units: ml/min/1.73

m2  (Modified MDRD Formula)Reference Range: > or = 60 ml/min/1.73 m2

 

             CREATININE (test code = CREAT) 2.10 MG/DL   0.66-1.25    H         

    

 

             TOTAL PROTEIN (test code = PROT) 10.6 G/DL    6.2-7.6      H       

      

 

             ALBUMIN (test code = ALB) 5.8 G/DL     3.5-5.0      H             

 

             CALCIUM (test code = CA) 10.6 MG/DL   8.4-10.2     H             

 

             BILIRUBIN TOTAL (test code = BILT) 0.3 MG/DL    0.2-1.3      N     

        

 

             SGOT/AST (test code = AST) 33 UNITS/L   17-59        N             

 

             SGPT/ALT (test code = ALT) 19 UNITS/L   21-72        L             

 

             ALKALINE PHOSPHATASE (test code = ALKP) 101 UNITS/L         N

             





[id number] 10:32:00* 



             Test Item    Value        Reference Range Interpretation Comments

 

             TROPONIN-I (test code = TROPI) < 0.012 NG/ML 0.012-0.033  L        

     





COMPREHENSIVE METABOLIC [id number] 10:26:00* 



             Test Item    Value        Reference Range Interpretation Comments

 

             SODIUM (test code = NA) 135 MMOL/L   137-145      L             

 

             POTASSIUM (test code = K) 3.6 MMOL/L   3.5-5.1      N             

 

             CHLORIDE (test code = CL) 89 MMOL/L           L             

 

             CARBON DIOXIDE (test code = CO2) 28 MMOL/L    22-30        N       

      

 

             ANION GAP (test code = GAP) 22 MMOL/L    14-24        N            

 

 

             GLUCOSE (test code = GLU) 95 MG/DL            N             

 

             BLOOD UREA NITROGEN (test code = BUN) 37 MG/DL     9-20         H  

           

 

             GLOMERULAR FILTRATION RATE (test code = GFR) 45                    

                 Reporting units: ml/min/1.73

m2  (Modified MDRD Formula)Reference Range: > or = 60 ml/min/1.73 m2

 

             CREATININE (test code = CREAT) 2.10 MG/DL   0.66-1.25    H         

    

 

             TOTAL PROTEIN (test code = PROT) 10.6 G/DL    6.2-7.6      H       

      

 

             ALBUMIN (test code = ALB) 5.8 G/DL     3.5-5.0      H             

 

             CALCIUM (test code = CA) 10.6 MG/DL   8.4-10.2     H             

 

             BILIRUBIN TOTAL (test code = BILT) 0.3 MG/DL    0.2-1.3      N     

        

 

             SGOT/AST (test code = AST) 33 UNITS/L   17-59        N             

 

             SGPT/ALT (test code = ALT) 19 UNITS/L   21-72        L             

 

             ALKALINE PHOSPHATASE (test code = ALKP) 101 UNITS/L         N

             





[id number] 10:26:00* 



             Test Item    Value        Reference Range Interpretation Comments

 

             TROPONIN-I (test code = TROPI)  NG/ML       0.0-0.045              

    





URINALYSIS [id number] 10:11:00* 



             Test Item    Value        Reference Range Interpretation Comments

 

             UA COLOR (test code = COLU) YELLOW       YELLOW                    

 

 

             UA APPEARANCE (test code = APPU) CLEAR        CLEAR                

      

 

             UA GLUCOSE DIPSTICK (test code = DGLUU) NORMAL MG/DL NORMAL        

             

 

             UA BILIRUBIN DIPSTICK (test code = BILU) NEGATIVE MG/DL NEGATIVE   

                

 

             UA KETONE DIPSTICK (test code = KETU) 5 MG/DL      NEGATIVE        

           

 

             UA SPECIFIC GRAVITY (test code = SGU) 1.020        1.003-1.030  N  

           

 

             UA BLOOD DIPSTICK (test code = SERENA) 10 Min/mm3   NEGATIVE     A    

         

 

             UA PH DIPSTICK (test code = GIL) 5.0          5.0-9.0      N       

      

 

             UA PROTEIN DIPSTICK (test code = PROU) 30 MG/DL     NEGATIVE     A 

            

 

             UA UROBILINIOGEN DIPSTICK (test code = URO) NORMAL MG/DL NORMAL    

                 

 

             UA NITRITE DIPSTICK (test code = MELI) NEGATIVE     NEGATIVE       

            

 

             UA LEUKOCYTE ESTERASE DIPSTICK (test code = LEUU) TRACE /mm3   NEGA

TIVE     A             

 

             UA CULTURE NEEDED? (test code = UACULT)  Criteria    Culture Chk   

             





UA [id number] 10:11:00* 



             Test Item    Value        Reference Range Interpretation Comments

 

             UA RBC (test code = RBCU)  RBC/HPF     0-3                        

 

             UA WBC (test code = XWBCU)  WBC/HPF     0-5                        

 

             UA EPITHELIAL CELLS (test code = EPIU)  EPI/HPF     FEW            

            

 

             UA BACTERIA (test code = XBACU)              NONE                  

     





DRUGS OF ABUSE SCREEN [id number] 10:11:00* 



             Test Item    Value        Reference Range Interpretation Comments

 

             UR COCAINE (test code = COCAU) NEGATIVE     NEGATIVE               

   Cut off Value: 300 ng/mL

 

             UR CANNABINOIDS (THC) (test code = CANU) POSITIVE     NEGATIVE     

             Cut off Value: 50 

ng/mL

 

             UR AMPHETAMINE (test code = AMPHU) NEGATIVE     NEGATIVE           

       Cut off Value: 1000 ng/mL

 

             UR BARBITURATE QUAL (test code = BARBQLU) NEGATIVE     NEGATIVE    

              Cut off Value: 200

ng/mL

 

             UR BENZODIAZEPINE (test code = BENZU) NEGATIVE     NEGATIVE        

          Cut off Value: 200 

ng/mL

 

             UR OPIATES QUAL (test code = OPIAQLU) NEGATIVE     NEGATIVE        

          Cut off Value: 300 

ng/mL

 

             UR PHENCYCLIDINE (PCP) (test code = PHENCU) NEGATIVE     NEGATIVE  

                Cut off Value: 

25 ng/mL





CBC W/AUTO [id number] 10:06:00* 



             Test Item    Value        Reference Range Interpretation Comments

 

             WHITE BLOOD CELL (test code = WBC) 9.0 K/MM3    3.8-9.8      N     

        

 

             RED BLOOD CELL (test code = RBC) 5.12 M/MM3   3.95-5.67    N       

      

 

             HEMOGLOBIN (test code = HGB) 15.9 G/DL    12.4-16.7    N           

  

 

             HEMATOCRIT (test code = HCT) 47.4 %       35.9-49.5    N           

  

 

             MEAN CELL VOLUME (test code = MCV) 93 fL        81.7-96.1    N     

        

 

             MEAN CELL HGB (test code = MCH) 31.1 pg      27.6-33.2    N        

     

 

             MEAN CELL HGB CONCETRATION (test code = MCHC) 33.5 %       32.9-35.

5    N             

 

             RED CELL DISTRIBUTION WIDTH (test code = RDW) 12.3 %       12.1-15.

2    N             

 

             PLATELET COUNT (test code = PLT) 280 K/MM3    129-368      N       

      

 

             MEAN PLATELET VOLUME (test code = MPV) 9.5 fl       7.4-10.4     N 

            

 

             NEUTROPHIL % (test code = NT%) 59.9 %       43-75        N         

    

 

             IMMATURE GRANULOCYTE % (test code = IG%) 0.4 %        0.0-2.0      

N             

 

             LYMPHOCYTE % (test code = LY%) 21.6 %       14-44        N         

    

 

             MONOCYTE % (test code = MO%) 17.4 %       4-13         H           

  

 

             EOSINOPHIL % (test code = EO%) 0.4 %        0-6          N         

    

 

             BASOPHIL % (test code = BA%) 0.3 %        0-2          N           

  

 

             NUCLEATED RBC % (test code = NRBC%) 0.0 %        0-1.0        N    

         

 

             NEUTROPHIL # (test code = NT#) 5.4 K/mm3    2.0-7.6      N         

    

 

             IMMATURE GRANULOCYTE # (test code = IG#) 0.04 x10 3/uL 0-0.03      

 H             

 

             LYMPHOCYTE # (test code = LY#) 2.0 K/mm3    1.0-3.8      N         

    

 

             MONOCYTE # (test code = MO#) 1.57 K/mm3   0.1-0.8      H           

  

 

             EOSINOPHIL # (test code = EO#) 0.0 K/mm3    0.0-0.2      N         

    

 

             BASOPHIL # (test code = BA#) 0.03 K/mm3   0.0-0.2      N           

  

 

             NUCLEATED RBC # (test code = NRBC#) 0.0 K/mm3    0.0-0.1      N    

         





- XR CHEST [id number] 09:56:00  Patient Name: JAIDEN VILLASENOR JR          
Unit No: [id number]          EXAMS:                                             
 CPT CODE:       [id number] XR CHEST 1V                                [address]67     
              C3               TIME OF STUDY:  2019 9:23 AM                
      REASON FOR EXAM:   SYNCOPE                COMPARISON:  None.              
FINDINGS:  AP view of the chest was obtained.                Lungs: Normal lung 
volume. No mass, or consolidation. Normal pulmonary       vascularity.          
    Pleura: No pleural effusion or  pneumothorax.               Heart and 
Mediastinum: Normal cardiomediastinal silhouette and great       vessels.       
       Bones: Normal regional skeletal structures.                         
IMPRESSION:           1. No acute cardiopulmonary process.          ** 
Electronically Signed by Johan Bhatia MD on 2019 at 0956 **                 
    Reported and signed by: Johan Bhatia MD                        CC: Preeti Cross MD; Mark Jimenez MD                                                           
                                             Technologist: LAILA Khoury, 
RT(R)                              Transcrpt Date/Tm/Trnsp: 2019 (0956) 
Be.SI1                 Orig Print D/T: S: 2019 (59)                  
           [facility]                           NAME: JAIDEN VILLASENOR JR     
     [address] [address]                      PHYS: Mark Nicole [place]     
[place] [address]                   : 1985 AGE: 33      SEX: M         
                             ACCT NO: [id number] LOC: Z.LMN-1        PHONE #: 
[phone]               EXAM DATE: 2019 STATUS: DEP ER     FAX #: 
[phone]               RADIOLOGY NO:            PAGE  1                     
 Signed Report                                - XR CHEST [id number] 09:56:00  
Patient Name: JAIDEN VILLASENOR                      Unit No: [id number]          EX
AMS:                                               CPT CODE:       [id number] XR 
CHEST 1V                                03637                    C3             
 TIME OF STUDY:  2019 9:23 AM                       REASON FOR EXAM:   SYN
COPE                COMPARISON:  None.               FINDINGS:  AP view of the c
hest was obtained.                Lungs: Normal lung volume. No mass, or consoli
dation. Normal pulmonary       vascularity.               Pleura: No pleural eff
usion or  pneumothorax.               Heart and Mediastinum: Normal cardiomedias
tinal silhouette and great       vessels.               Bones: Normal regional s
keletal structures.                         IMPRESSION:           1. No acute ca
rdiopulmonary process.          ** Electronically Signed by Johan Bhatia MD on  at 0956 **                      Reported and signed by: Johan Bhatia MD   
                    CC: Preeti Cross MD; Mark Jimenez MD                       
                                                                                
Technologist: LAILA Khoury, RT(R)                              Transcrpt 
Date/Tm/Trnsp: 2019 (0956) Be.SI1                 Orig Print D/T: S: 
2019 (0959)                              [facility]                        
  NAME: JAIDEN VILLASENOR                      [address]
HYS: [place] [address]                   : 0
1985 AGE: 33      SEX: M                                       ACCT NO: [id number]
[id number] LOC: Z.ERS        PHONE #: [phone]               EXAM DATE:  STATUS: DEP ER     FAX #: [phone]               RADIOLOGY NO:        
   PAGE  1                       Signed Report                                - 
XR CHEST [id number] 09:56:00  Patient Name: JAIDEN VILLASENOR                      
Unit No: [id number]          EXAMS:                                             
 CPT CODE:       [id number] XR CHEST 1V                                [address]396     
              C3               TIME OF STUDY:  2019 9:23 AM                
      REASON FOR EXAM:   SYNCOPE                COMPARISON:  None.              
FINDINGS:  AP view of the chest was obtained.                Lungs: Normal lung 
volume. No mass, or consolidation. Normal pulmonary       vascularity.          
    Pleura: No pleural effusion or  pneumothorax.               Heart and 
Mediastinum: Normal cardiomediastinal silhouette and great       vessels.       
       Bones: Normal regional skeletal structures.                         
IMPRESSION:           1. No acute cardiopulmonary process.          ** 
Electronically Signed by Johan Bhatia MD on 2019 at 0956 **                 
    Reported and signed by: Johan Bhatia MD                        CC: Preeti Cross MD; Mark Jimenze MD                                                           
                                             Technologist: LAILA Khoury, 
RT(R)                              Transcrpt Date/Tm/Trnsp: 2019 (0956) 
t.SDR.SI1                 Orig Print D/T: S: 2019 (0959)                  
           [facility]                           NAME: JAIDEN VILLASENOR                
     [address]                      PHYS: [place] [address]                   : 1985 AGE: 33      SEX: M         
                             ACCT NO: [id number] LOC: Z.ERS        PHONE #: 
[phone]               EXAM DATE: 2019 STATUS: PRE ER     FAX #: 
[phone]               RADIOLOGY NO:            PAGE  1                     
 Signed Report                                URINALYSIS [id number] 
09:47:00* 



             Test Item    Value        Reference Range Interpretation Comments

 

             UA COLOR (test code = COLU) YELLOW       YELLOW                    

 

 

             UA APPEARANCE (test code = APPU) CLEAR        CLEAR                

      

 

             UA GLUCOSE DIPSTICK (test code = DGLUU) NORMAL MG/DL NORMAL        

             

 

             UA BILIRUBIN DIPSTICK (test code = BILU) NEGATIVE MG/DL NEGATIVE   

                

 

             UA KETONE DIPSTICK (test code = KETU) 5 MG/DL      NEGATIVE        

           

 

             UA SPECIFIC GRAVITY (test code = SGU) 1.020        1.003-1.030  N  

           

 

             UA BLOOD DIPSTICK (test code = SERENA) 10 Min/mm3   NEGATIVE     A    

         

 

             UA PH DIPSTICK (test code = GIL) 5.0          5.0-9.0      N       

      

 

             UA PROTEIN DIPSTICK (test code = PROU) 30 MG/DL     NEGATIVE     A 

            

 

             UA UROBILINIOGEN DIPSTICK (test code = URO) NORMAL MG/DL NORMAL    

                 

 

             UA NITRITE DIPSTICK (test code = MELI) NEGATIVE     NEGATIVE       

            

 

             UA LEUKOCYTE ESTERASE DIPSTICK (test code = LEUU) TRACE /mm3   NEGA

TIVE     A             

 

             UA CULTURE NEEDED? (test code = UACULT)  Criteria    Culture Chk   

             





UA [id number] 09:47:00* 



             Test Item    Value        Reference Range Interpretation Comments

 

             UA RBC (test code = RBCU)  RBC/HPF     0-3                        

 

             UA WBC (test code = XWBCU)  WBC/HPF     0-5                        

 

             UA EPITHELIAL CELLS (test code = EPIU)  EPI/HPF     FEW            

            

 

             UA BACTERIA (test code = XBACU)              NONE                  

     





DRUGS OF ABUSE SCREEN [id number] 09:47:00* 



             Test Item    Value        Reference Range Interpretation Comments

 

             UR COCAINE (test code = COCAU)              NEGATIVE               

    

 

             UR CANNABINOIDS (THC) (test code = CANU)              NEGATIVE     

              

 

             UR AMPHETAMINE (test code = AMPHU)              NEGATIVE           

        

 

             UR BARBITURATE QUAL (test code = BARBQLU)              NEGATIVE    

               

 

             UR BENZODIAZEPINE (test code = BENZU)              NEGATIVE        

           

 

             UR OPIATES QUAL (test code = OPIAQLU)              NEGATIVE        

           

 

             UR PHENCYCLIDINE (PCP) (test code = PHENCU)              NEGATIVE  

                 





URINALYSIS [id number] 09:47:00* 



             Test Item    Value        Reference Range Interpretation Comments

 

             UA COLOR (test code = COLU) YELLOW       YELLOW                    

 

 

             UA APPEARANCE (test code = APPU) CLEAR        CLEAR                

      

 

             UA GLUCOSE DIPSTICK (test code = DGLUU) NORMAL MG/DL NORMAL        

             

 

             UA BILIRUBIN DIPSTICK (test code = BILU) NEGATIVE MG/DL NEGATIVE   

                

 

             UA KETONE DIPSTICK (test code = KETU) 5 MG/DL      NEGATIVE        

           

 

             UA SPECIFIC GRAVITY (test code = SGU) 1.020        1.003-1.030  N  

           

 

             UA BLOOD DIPSTICK (test code = SERENA) 10 Min/mm3   NEGATIVE     A    

         

 

             UA PH DIPSTICK (test code = GIL) 5.0          5.0-9.0      N       

      

 

             UA PROTEIN DIPSTICK (test code = PROU) 30 MG/DL     NEGATIVE     A 

            

 

             UA UROBILINIOGEN DIPSTICK (test code = URO) NORMAL MG/DL NORMAL    

                 

 

             UA NITRITE DIPSTICK (test code = MELI) NEGATIVE     NEGATIVE       

            

 

             UA LEUKOCYTE ESTERASE DIPSTICK (test code = LEUU) TRACE /mm3   NEGA

TIVE     A             

 

             UA CULTURE NEEDED? (test code = UACULT)  Criteria    Culture Chk   

             





UA [id number] 09:47:00* 



             Test Item    Value        Reference Range Interpretation Comments

 

             UA RBC (test code = RBCU)  RBC/HPF     0-3                        

 

             UA WBC (test code = XWBCU)  WBC/HPF     0-5                        

 

             UA EPITHELIAL CELLS (test code = EPIU)  EPI/HPF     FEW            

            

 

             UA BACTERIA (test code = XBACU)              NONE                  

     





DRUGS OF ABUSE SCREEN [id number] 09:47:00* 



             Test Item    Value        Reference Range Interpretation Comments

 

             UR COCAINE (test code = COCAU)              NEGATIVE               

    

 

             UR CANNABINOIDS (THC) (test code = CANU)              NEGATIVE     

              

 

             UR AMPHETAMINE (test code = AMPHU)              NEGATIVE           

        

 

             UR BARBITURATE QUAL (test code = BARBQLU)              NEGATIVE    

               

 

             UR BENZODIAZEPINE (test code = BENZU)              NEGATIVE        

           

 

             UR OPIATES QUAL (test code = OPIAQLU)              NEGATIVE        

           

 

             UR PHENCYCLIDINE (PCP) (test code = PHENCU)              NEGATIVE  

                 





- CT HEAD/BRAIN W/O [id number] 09:44:00 Patient Name: JAIDEN VILLASENOR JR
        Unit No: [id number]         EXAMS:                                      
        CPT CODE:       [id number] CT HEAD/BRAIN W/O CONT                     
98519                    C3               TIME OF STUDY:   2019 9:23 AM    
          REASON FOR EXAM: SYNCOPE                 COMPARISON: None.         
TECHNIQUE: Routine non contrast enhanced axial images were obtained       from 
the skull base to the vertex.Sagittal and coronal reformats were       obtained 
and reviewed.       One or more of the following radiation dose reduction 
techniques was       used: automated exposure control, adjustment of mA and/or 
KV according       to patient size, and/or utilization of iterative 
reconstruction       technique.               FINDINGS: The ventricles and 
cortical sulci are age-appropriate.        There is no midline shift or mass-
effect. No acute intra-axial       hemorrhage is seen.  There are no abnormal 
areas of increased or       decreased density to suggest acute hemorrhage or 
edema.  No       extra-axial masses or collections are present.                 
The bony calvarium is intact. The visualized paranasal sinuses are       clear. 
               IMPRESSION:           No acute intracranial abnormality.  No CT 
evidence of large acute         cortical infarction.  No hemorrhage or focal in
tra-axial mass.          ** Electronically Signed by Johan Bhatia MD on 2019
at 0944 **                      Reported and signed by: Johan Bhatia MD           
            CC: Preeti Cross MD; Mark Jimenez MD                               
                                                                         Tech
nologist: [facility] STUDENT ; Piper Gonzalez, RT(R)(C CTDI:        DLP:         Trnscrpt:  (0944) t.SDR.SI1                              [facility]                 
         NAME: JAIDEN VILLASENOR JR           [address]                  
   PHYS: [place] [address]                   
: 1985 AGE: 33      SEX: M                                       ACCT N
O: [id number] LOC: [employer]        PHONE #: [phone]               EXAM DATE:
2019 STATUS: [facility] ER     FAX #: [phone]               RAD #:          
  D/C DT                PAGE  1                       Signed Report             
                   Patient Name: JAIDEN VILLASENOR JR         Unit No: [id number]
[id number]         EXAMS:                                               CPT CODE:   
   [id number] CT HEAD/BRAIN W/O CONT                     73031                <
Continued>  Orig Print D/T: S: 2019 (0947)                                
                                       [facility]                           NAME:
JAIDEN VILLASENOR JR           [address]                      PHYS: 
[place] [address]                   : 
1985 AGE: 33      SEX: M                                       ACCT NO: 
[id number] LOC: [facility]        PHONE #: [phone]               EXAM DATE: 
2019 STATUS: [facility] ER     FAX #: [phone]               RAD #:          
  D/C DT                PAGE  2                       Signed Report             
                 - CT HEAD/BRAIN W/O [id number] 09:44:00 Patient Name: 
JAIDEN VILLASENOR                    Unit No: [id number]         EXAMS:               
                               CPT CODE:       [id number] CT HEAD/BRAIN W/O CONT 
                   84082                    C3               TIME OF STUDY:   
2019 9:23 AM               REASON FOR EXAM: SYNCOPE                 
COMPARISON: None.         TECHNIQUE: Routine non contrast enhanced axial images 
were obtained       from the skull base to the vertex.Sagittal and coronal 
reformats were       obtained and reviewed.       One or more of the following 
radiation dose reduction techniques was       used: automated exposure control, 
adjustment of mA and/or KV according       to patient size, and/or utilization 
of iterative reconstruction       technique.               FINDINGS: The ventri
cles and cortical sulci are age-appropriate.        There is no midline shift or
mass-effect. No acute intra-axial       hemorrhage is seen.  There are no abnor
mal areas of increased or       decreased density to suggest acute hemorrhage or
edema.  No       extra-axial masses or collections are present.                 
The bony calvarium is intact. The visualized paranasal sinuses are       clear. 
               IMPRESSION:           No acute intracranial abnormality.  No CT 
evidence of large acute         cortical infarction.  No hemorrhage or focal in
tra-axial mass.          ** Electronically Signed by Johan Bhatia MD on 2019
at 0944 **                      Reported and signed by: Johan Bhatia MD           
            CC: Preeti Cross MD; aMrk Jimenez MD                               
                                                                         Tech
nologist: [facility] STUDENT ; Piper Gonzalez, RT(R)(C CTDI:        DLP:         Trnscrpt:  (0944) t.SDR.SI1                              [facility]                 
         NAME: JAIDEN VILLASENOR [address]                  
   PHYS: [place] [address]                   
: 1985 AGE: 33      SEX: M                                       ACCT N
O: [id number] LOC: LMN-1        PHONE #: [phone]               EXAM DATE:
2019 STATUS: [facility] ER     FAX #: [phone]               RAD #:          
  D/C DT                PAGE  1                       Signed Report             
                   Patient Name: JAIDEN VILLASENOR                    Unit No: [id number]
[id number]         EXAMS:                                               CPT CODE:   
   [id number] CT HEAD/BRAIN W/O CONT                     88212                <
Continued>  Orig Print D/T: S: 2019 (0947)                                
                                       [facility]                           NAME:
JAIDEN VILLASENOR                      PHYS: 
[place] [address]                   : 
1985 AGE: 33      SEX: M                                       ACCT NO: 
[id number] LOC: [employer].LMN-1        PHONE #: [phone]               EXAM DATE: 
2019 STATUS: [facility] ER     FAX #: [phone]               RAD #:          
  D/C DT                PAGE  2                       Signed Report             
                 - CT HEAD/BRAIN W/O [id number] 09:44:00 Patient Name: 
JAIDEN VILLASENOR                    Unit No: [id number]         EXAMS:               
                               CPT CODE:       [id number] CT HEAD/BRAIN W/O CONT 
                   14794                    C3               TIME OF STUDY:   
2019 9:23 AM               REASON FOR EXAM: SYNCOPE                 
COMPARISON: None.         TECHNIQUE: Routine non contrast enhanced axial images 
were obtained       from the skull base to the vertex.Sagittal and coronal 
reformats were       obtained and reviewed.       One or more of the following 
radiation dose reduction techniques was       used: automated exposure control, 
adjustment of mA and/or KV according       to patient size, and/or utilization 
of iterative reconstruction       technique.               FINDINGS: The ventri
cles and cortical sulci are age-appropriate.        There is no midline shift or
mass-effect. No acute intra-axial       hemorrhage is seen.  There are no abnor
mal areas of increased or       decreased density to suggest acute hemorrhage or
edema.  No       extra-axial masses or collections are present.                 
The bony calvarium is intact. The visualized paranasal sinuses are       clear. 
               IMPRESSION:           No acute intracranial abnormality.  No CT 
evidence of large acute         cortical infarction.  No hemorrhage or focal in
tra-axial mass.          ** Electronically Signed by Johan Bhatia MD on 2019
at 0944 **                      Reported and signed by: Johan Bhatia MD           
            CC: Preeti Cross MD; Mark Jimenez MD                               
                                                                         Tech
nologist: [facility] STUDENT ; Piper Gonzalez, RT(R)(C CTDI:        DLP:         Trnscrpt:  (0944) t.SDR.SI1                              [facility]                 
         NAME: JAIDEN VILLASENOR                      [address]                  
   PHYS: [place] [address]                   
: 1985 AGE: 33      SEX: M                                       ACCT N
O: [id number] LOC: [facility]        PHONE #: [phone]               EXAM DATE:
2019 STATUS: [facility] ER     FAX #: [phone]               RAD #:          
  D/C DT                PAGE  1                       Signed Report             
                   Patient Name: JAIDEN VILLASENOR                    Unit No: [id number]
[id number]         EXAMS:                                               CPT CODE:   
   [id number] CT HEAD/BRAIN W/O CONT                     75284                <
Continued>  Orig Print D/T: S: 2019 (0947)                                
                                       [facility]                           NAME:
JAIDEN VILLASENOR                      [address]                      PHYS: 
[place] [address]                   : 
1985 AGE: 33      SEX: M                                       ACCT NO: 
[id number] LOC: [facility]        PHONE #: [phone]               EXAM DATE: 
2019 STATUS: [facility]     FAX #: [phone]               RAD #:          
  D/C DT                PAGE  2                       Signed Report             
                 - CT ABD PELVIS W/O [id number] 11:23:00 Patient Name: 
JAIDEN VILLASENOR JR         Unit No: [id number]         EXAMS:               
                               CPT CODE:       [id number] CT ABD PELVIS W/O CONT 
                   [address]8859                    EXAMINATION:  - CT ABD PELVIS W/O 
CONT.               LOCATION: B2.               HISTORY: KIDNEY STONE .         
     COMPARISON: CT abdomen and pelvis dated 2015.               
TECHNIQUE: CT abdomen and pelvis was performed without the use of IV       
contrast.  Sagittal and coronal reconstructed images were available       for 
review.               All CT scans are performed using radiation dose reduction 
technique.        Technical factors are evaluated and adjusted to insure appropr
iate       moderation of exposure.  Automated dose management technology is     
 applied to adjust the radiation dose to minimize exposure while       achieving
a diagnostic quality image.               FINDINGS:       Lower chest:       The
lung bases are clear.  Heart size is normal.               Abdomen:       Lay
ering hyperdensities are seen in the gallbladder consistent with       sludge.  
The noncontrast appearance of the liver, pancreas, spleen,       left kidney and
bilateral adrenal glands is within normal limits.        Punctate 1 mm nonobstr
uctive stone is seen in the right superior       kidney (axial image 23 and kirsty
nal image 48).  There is no evidence of       obstructive urolithiasis.         
       There is no evidence of bowel obstruction.  No pneumoperitoneum or       
ascites is identified.  There is no mesenteric or retroperitoneal       adenopa
thy.               Pelvis:       The urinary bladder is nondistended.  No inguin
al adenopathy is       identified.               Bones/soft tissues:       No ac
ernie osseous abnormality is identified.  No aggressive lytic or       blastic les
ions are seen.                 IMPRESSION:         Punctate 1 mm nonobstructive 
right renal stone.  No evidence of         obstructive urolithiasis.            
      Layering hyperdensities in the gallbladder consistent with sludge.        
 ** Electronically Signed by Becca Hidalgo MD on 2017 at 1123 **           
          Reported and signed by: Becca Hidalgo MD           [facility]           
               NAME: JAIDEN VILLASENOR JR           [address]            
         PHYS: KIRSTY Sy Mark Jimenez MD     [place] [id number]               
   : 1985 AGE: 32      SEX: M                                       
ACCT NO: [id number] [facility]: [facility]          PHONE #: [phone]               EXAM
DATE: 2017 STATUS: UNK        FAX #: [phone]               RAD #:    
        D/C DT                PAGE  1                       Signed Report       
            (CONTINUED)  Patient Name: JAIDEN VILLASENOR JR         Unit No: 
[id number]         EXAMS:                                               CPT 
CODE:       [id number] CT ABD PELVIS W/O CONT                     [address]234          
     <Continued>                                                   CC: Mark Jimenez MD                                                                       
                                                 Technologist: Kory Inman,
RT(R); ...   CTDI:        DLP:         Trnscrpt: 2017 (1123) t.SDR.PR7    
                         [facility]                           NAME: JAIDEN VILLASENOR JR           [address]                      PHYS: KIRSTY Sy 
[place] [address]                   : 1985 AGE: 
32      SEX: M                                       ACCT NO: [id number] LOC: 
[facility]          PHONE #: [phone]               EXAM DATE: 2017 [facility]: 
[facility]        FAX #: [phone]               RAD #:             D/C DT          
     PAGE  2                       Signed Report                                
Patient Name: JAIDEN VILLASENOR JR         Unit No: [id number]         EXAMS: 
                                             CPT CODE:       [id number] CT ABD 
PELVIS W/O CONT                     16955                <Continued>  Orig Print
D/T: S: 2017 (1126)                                                       
                [facility]                           NAME: JAIDEN VILLASENOR JR
          [address]                      PHYS: [place] [address]                   : 1985 AGE: 32      SEX: M     
                                 ACCT NO: [id number] [facility]: [facility]          PHONE 
#: [phone]               EXAM DATE: 2017 STATUS: UNK        FAX #: 
[phone]               RAD #:             D/C DT                PAGE  3     
                 Signed Report                               - CT ABD PELVIS W/O
[id number] 11:23:00 Patient Name: JAIDEN VILLASENOR                    Unit No: 
[id number]         EXAMS:                                               CPT 
CODE:       [id number] CT ABD PELVIS W/O CONT                     [address]28          
         EXAMINATION:  - CT ABD PELVIS W/O CONT.               LOCATION: B2.    
          HISTORY: KIDNEY STONE .               COMPARISON: CT abdomen and 
pelvis dated 2015.               TECHNIQUE: CT abdomen and pelvis was 
performed without the use of IV       contrast.  Sagittal and coronal 
reconstructed images were available       for review.               All CT scans
are performed using radiation dose reduction technique.        Technical factors
are evaluated and adjusted to insure appropriate       moderation of exposure.  
Automated dose management technology is       applied to adjust the radiation 
dose to minimize exposure while       achieving a diagnostic quality image.     
         FINDINGS:       Lower chest:       The lung bases are clear.  Heart 
size is normal.               Abdomen:       Layering hyperdensities are seen in
the gallbladder consistent with       sludge.  The noncontrast appearance of the
liver, pancreas, spleen,       left kidney and bilateral adrenal glands is 
within normal limits.        Punctate 1 mm nonobstructive stone is seen in the 
right superior       kidney (axial image 23 and coronal image 48).  There is no 
evidence of       obstructive urolithiasis.                 There is no evidence
of bowel obstruction.  No pneumoperitoneum or       ascites is identified.  
There is no mesenteric or retroperitoneal       adenopathy.               
Pelvis:       The urinary bladder is nondistended.  No inguinal adenopathy is   
   identified.               Bones/soft tissues:       No acute osseous 
abnormality is identified.  No aggressive lytic or       blastic lesions are 
seen.                 IMPRESSION:         Punctate 1 mm nonobstructive right 
renal stone.  No evidence of         obstructive urolithiasis.                  
Layering hyperdensities in the gallbladder consistent with sludge.          ** 
Electronically Signed by Becca Hidalgo MD on 2017 at 1123 **               
      Reported and signed by: Becca Hidalgo MD           [facility]               
           NAME: JAIDEN VILLASENOR                      [address]                
     PHYS: [place] [address]                   
: 1985 AGE: 32      SEX: M                                       ACCT 
NO: [id number] LOC: [facility]          PHONE #: [phone]               EXAM 
DATE: 2017 [facility]        FAX #: [phone]               RAD #:    
        D/C DT                PAGE  1                       Signed Report       
            (CONTINUED)  Patient Name: JAIDEN VILLASENOR                    Unit No: 
[id number]         EXAMS:                                               CPT 
CODE:       [id number] CT ABD PELVIS W/O CONT                     28184          
     <Continued>                                                   CC: Mark Jimenez MD                                                                       
                                                 Technologist: Kory Yzaquire,
RT(R); ...   CTDI:        DLP:         Trnscrpt: 2017 (1127) t.SDR.PR7    
                         [facility]                           NAME: JA[address]EN [address]ILL[address]                      PHYS: [place] [address]                   : 1985 AGE: 
32      SEX: M                                       ACCT NO: [id number] LOC: 
[facility]          PHONE #: [phone]               EXAM DATE: 2017 STATUS: 
[facility]        FAX #: [phone]               RAD #:             D/C DT          
     PAGE  2                       Signed Report                                
Patient Name: JAIDEN VILLASENOR                    Unit No: [id number]         EXAMS: 
                                             CPT CODE:       [id number] CT ABD 
PELVIS W/O CONT                     82867                <Continued>  Orig Print
D/T: S: 2017 (1126)                                                       
                RONAN Jacobson                           NAME: JAIDEN VILLASENOR[address]                      PHYS: Mark Nicole [place] [address]                   : 1985 AGE: 32      SEX: M     
                                 ACCT NO: [id number] LOC: [facility]          PHONE 
#: [phone]               EXAM DATE: 2017 STATUS: [facility]        FAX #: 
[phone]               RAD #:             D/C DT                PAGE  3     
                 Signed Report                               - CT ABD PELVIS W/O
[id number] 05:04:00  Name: JAIDEN VILLASENOR [place]  
                   : 1985 Age/S: 30  / M         [id number]    
         Unit #: [id number]     Loc:               [place] [address]           
    Phys: Kwasi Duque Jr, MD                                             
Acct: [id number]  Dis Date:               Status: [facility]                         
           PHONE #: [phone]     Exam Date: 2015  022                
    FAX #:                   Reason: right flank pain                           
        EXAMS:                                               CPT:           
[id number] CT ABD PELVIS W/O CONT                     [id number]                    
AFTER HOURS SERVICE AT: 5:00 AM               Images available on PACS at 
approximately 4:50 AM               CT Scan of the Abdomen and Pelvis Without 
Contrast               Location Code M12               History: right flank pain
              Technique:  Axial and reconstructed coronal scans were performed 
on a       helical scanner pre oral and IV contrast. Study is limited secondary 
     to lack of oral and IV contrast.                Findings:                
Liver: No significant findings.        Gallbladder/Biliary: No significant 
findings.        Pancreas: No significant findings.        Spleen:  No 
significant findings.       Adrenals: No significant findings.        Kidneys: 
There is mild fullness in the right renal collecting system       without ashanti 
hydronephrosis.  There is a phlebolith versus tiny 1 mm       UVJ calculus on 
the right seen on axial image 88.  There is no       nephrolithiasis or 
hydronephrosis in the left kidney.       Bladder: No significant findings.      
 Bowel: No significant findings. The appendix is unremarkable.                 
Impression:                   1.  Mild fullness in the right renal collecting 
system without ashanti         hydronephrosis.  Tiny 1 mm calcification in the 
pelvis may be         consistent with a UVJ calculus versus phlebolith.         
2.  Normal appendix and bowel.                  ** Electronically Signed by JOHN Quintero **          **              on 2015 at 0504             
**                      Reported and signed by: Dominga Quintero M.D.     CC: 
Nathanile Vincent Jr, MD                                                         
                                                       Technologist:Gaurang Millan,
RT(R); Kristopher Perez,   CTDI:        DLP: 250.01 Trnscb Date/Time: 2015 (0504) 
[email or web address]                       Orig Print D/T: S: 2015 (0507)     PAGE  
1                       Signed Report                               - US 
RETROPERITONEAL [id number] 16:00:00  Name: JAIDEN VILLASENOR [place]                      : 1953 Age/S: 61  / M         [id number] 
[address]              Unit #: [id number]     Loc:               [place]
[address]                Phys: Phil Real MD                            
              Acct: [id number]  Dis Date:               Status: UNK           
                         PHONE #: [phone]     Exam Date: 2015  9158  
                  FAX #:                   Reason: 230.0,546.02                 
                      EXAMS:                                               CPT: 
         [id number] US RETROPERITONEAL COM                     [id number]             
      Examination:  Renal ultrasound               Location code: S17           
   Comparison: None               Discussion:               Clinical history is 
remarkable for chronic renal disease.  Grayscale       and color Doppler 
evaluation of the kidneys and bladder is performed.        Right kidney is 12 cm
x 6.9 cm x 5.5 cm while the left is 12.1 cm x       5.7 cm x 5.4 cm.  Right 
kidney contains a 4.1 cm simple cyst       superiorly.  Left kidney contains a 
1.7 cm cyst, simple in appearance.        Renal cortical echotexture appears 
normal.               Bladder is unremarkable.                 Impression:      
            1.  Bilateral renal cystic change.  No acute sonographic abnormality
        is present.                    ** Electronically Signed by JOHN Banerjee **            **            on 2015 at 1600            **         
            Reported and signed by: Gaurang Banerjee M.D.                   CC: 
Phil Real MD                                                        
                                                       Technologist: 
Stephanie Jansen                                     [facility] Date/Time: 
2015 (1600) MineJH12                        PAGE  1                     
 Signed Report                                  Name: JADIEN VILLASENOR                      : 1953 Age/S: 61  / M        
[id number]              Unit #: [id number]     Loc:               
[place] [address]                Phys: Phil Real MD               
                           Acct: [id number]  Dis Date:               Status: 
UNK                                     PHONE #: [phone]     Exam Date: 
2015  1518                     FAX #:                   Reason: 
585.0,250.00                                        EXAMS:                      
                        CPT:           [id number] US RETROPERITONEAL COM         
           [id number]               <Continued> Orig Print D/T: S: 2015 (1603) 
   Probe:                                                                PAGE  2
                      Signed Report                               - US 
RETROPERITONEAL [id number] 16:00:00  Name: JAIDEN VILLASENOR                      : 1985 Age/S: 29  / M         [id number] 
[address]              Unit #: [id number]     Loc:               [place]
[address]                Phys: Phil Real MD                            
              Acct: [id number]  Dis Date:               Status: UNK           
                         PHONE #: [phone]     Exam Date: 2015  1517  
                  FAX #:                   Reason: 585.0,250.00                 
                      EXAMS:                                               CPT: 
         [id number] US RETROPERITONEAL COM                     [id number]             
      Examination:  Renal ultrasound               Location code: S17           
   Comparison: None               Discussion:               Clinical history is 
remarkable for chronic renal disease.  Grayscale       and color Doppler 
evaluation of the kidneys and bladder is performed.        Right kidney is 12 cm
x 6.9 cm x 5.5 cm while the left is 12.1 cm x       5.7 cm x 5.4 cm.  Right 
kidney contains a 4.1 cm simple cyst       superiorly.  Left kidney contains a 
1.7 cm cyst, simple in appearance.        Renal cortical echotexture appears 
normal.               Bladder is unremarkable.                 Impression:      
            1.  Bilateral renal cystic change.  No acute sonographic abnormality
        is present.                    ** Electronically Signed by JOHN Banerjee **            **            on 2015 at 1600            **         
            Reported and signed by: Gaurang Banerjee M.D.                   CC: 
Phil Real MD                                                        
                                                       Technologist: 
Stephanie Jansen                                     [facility] Date/Time: 
2015 (1600) MineJH12                        PAGE  1                     
 Signed Report                                  Name: JAIDEN VILLASENOR                      : 1985 Age/S: 29  / M        
[id number]              Unit #: [id number]     Loc:               
[place] [address]                Phys: Phil Real MD               
                           Acct: [id number]  Dis Date:               Status: 
UNK                                     PHONE #: [phone]     Exam Date: 
2015  1516                     FAX #:                   Reason: 
585.0,250.00                                        EXAMS:                      
                        CPT:           [id number] US RETROPERITONEAL COM         
           [id number]               <Continued> Orig Print D/T: S: 2015 (1605) 
   Probe:                                                                PAGE  2
                      Signed Report                               - US 
RETROPERITONEAL [id number] 16:00:00  Name: JAIDEN VILLASENORland                      : 1985 Age/S: 29  / M         [id number] 
[address]              Unit #: [id number]     Loc:               [place]
[address]                Phys: Phil Real MD                            
              Acct: [id number]  Dis Date:               Status: UNK           
                         PHONE #: [phone]     Exam Date: 2015  1517  
                  FAX #:                   Reason: 585.0,250.00                 
                      EXAMS:                                               CPT: 
         [id number] US RETROPERITONEAL COM                     [id number]             
      Examination:  Renal ultrasound               Location code: S17           
   Comparison: None               Discussion:               Clinical history is 
remarkable for chronic renal disease.  Grayscale       and color Doppler 
evaluation of the kidneys and bladder is performed.        Right kidney is 12 cm
x 6.9 cm x 5.5 cm while the left is 12.1 cm x       5.7 cm x 5.4 cm.  Right 
kidney contains a 4.1 cm simple cyst       superiorly.  Left kidney contains a 
1.7 cm cyst, simple in appearance.        Renal cortical echotexture appears 
normal.               Bladder is unremarkable.                 Impression:      
            1.  Bilateral renal cystic change.  No acute sonographic abnormality
        is present.                    ** Electronically Signed by JOHN Banerjee **            **            on 2015 at 1600            **         
            Reported and signed by: Gaurang Banerjee M.D.                   CC: 
Phil Real MD                                                        
                                                       Technologist: 
Stephanie Jansen                                     [facility] Date/Time: 
2015 (1600) MineJH12                        PAGE  1                     
 Signed Report                                  Name: JAIDEN VILLASENOR               
       [facility]                      : 1985 Age/S: 29  / M        
[id number] [address]              Unit #: [id number]     Loc:               
[place] [address]                Phys: Phil Real MD               
                           Acct: [id number]  Dis Date:               Status: 
UNK                                     PHONE #: [phone]     Exam Date: 
2015  8496                     FAX #:                   Reason: 
585.0,250.00                                        EXAMS:                      
                        CPT:           [id number] [facility]         
           [address]               <Continued> Orig Print D/T: S: 2015 (7620) 
   Probe:                                                                PAGE  2
                      Signed Report

## 2020-05-23 NOTE — XMS REPORT
Summary of Care: 10/24/19 - 10/26/19

                             Created on: 2112



JACKLYNJAIDEN

External Reference #: [id number]

: 1985

Sex: Male



Demographics





                          Address                   [address] 

[place]  [address]

 

                          Home Phone                [phone]

 

                          Preferred Language        English

 

                          Marital Status            Single

 

                          Rastafari Affiliation     None

 

                          Race                      Black or 

 

                                        Additional Race(s)  

 

                          Ethnic Group              Non-





Author





                          Author                    [facility]

ospital

 

                          Organization              [facility]

 

                          Address                   Unknown

 

                          Phone                     Unavailable







Encounter





DEBORAH Corbin(PUNEET) [id number] Date(s): 10/24/19 - 10/26/19

[facility] [address] [place] [address]-     [phone]


Encounter Diagnosis

Acute kidney failure, unspecified (Final) - 

Discharge Disposition: Left Against Medical Advise

Attending Physician: Boogie Lang MD

Admitting Physician: Boogie Lang MD





Vital Signs





                    1                   2                   3



                                         Most recent to   



                                         oldest [Reference   



                                         Range]:   

 

                                        167.64 cm 

                          (10/24/19 10:52 PM)       165.1 cm 

                          (10/24/19 10:38 PM)       165.1 cm 

                                        (10/24/19 12:05 PM)



                                         Height   

 

                                        58.523 kg 

                    (10/24/19 10:38 PM)                      



                                         Current Weight   

 

                                        98.2 DegF 

                          (10/26/19 7:56 AM)        98.5 DegF 

                          (10/26/19 4:00 AM)        98.4 DegF 

                                        (10/26/19 12:00 AM)



                                         Temperature Oral   



                                         [96.4-99.1 DegF]   

 

                                        112/72 mmHg 

                          (10/26/19 7:56 AM)        122/80 mmHg 

                          (10/26/19 4:00 AM)        124/72 mmHg 

                                        (10/26/19 12:00 AM)



                                         Blood Pressure   



                                         [/60-90 mmHg]   

 

                                        20 BRMIN 

                          (10/26/19 7:56 AM)        18 BRMIN 

                          (10/26/19 4:00 AM)        18 BRMIN 

                                        (10/26/19 12:00 AM)



                                         Respiratory Rate   



                                         [14-20 BRMIN]   

 

                                        69 bpm 

                          (10/26/19 7:56 AM)        75 bpm 

                          (10/26/19 4:00 AM)        70 bpm 

                                        (10/26/19 12:00 AM)



                                         Peripheral Pulse   



                                         Rate [ bpm]   

 

                                        53.409 kg 

                          (10/24/19 10:52 PM)       52.6 kg 

                          (10/24/19 12:05 PM)        



                                         Weight   

 

                                        19 m2 

                          (10/24/19 10:52 PM)       19.3 m2 

                          (10/24/19 12:05 PM)        



                                         Body Mass Index   







Problem List





No data available for this section



Allergies, Adverse Reactions, Alerts





No Known Medication Allergies



Medications





Benadryl

25 mg, 0.5 mL, Route: IVP, Drug form: INJ, Q6H, Dosing Weight 52.6, kg, PRN Itch
ing, Start date: 10/24/19 17:47:00 CDT, Duration: 30 day, Stop date: 19 17
:46:00 CST, 0

Notes: (Same as: Benadryl)

Start Date: 10/24/19

Stop Date: 10/26/19

Status: Discontinued



Dilaudid

1 mg, 0.5 mL, Route: IVP, Drug form: INJ, Q4H, Dosing Weight 52.6, kg, PRN Pain 
Score 7-10, Start date: 10/24/19 17:47:00 CDT, Duration: 30 day, Stop date: 11/2
3/19 17:46:00 CST, 0

Notes: Same as Dilaudid

Start Date: 10/24/19

Stop Date: 10/25/19

Status: Discontinued



Dilaudid

0.5 mg, 0.25 mL, Route: IVP, Drug form: INJ, Q4H, Dosing Weight 52.6, kg, PRN Pa
in Score 7-10, Start date: 10/25/19 19:18:00 CDT, Duration: 30 day, Stop date: 19 19:17:00 CST, 0

Notes: Same as Dilaudid

Start Date: 10/25/19

Stop Date: 10/26/19

Status: Discontinued



ketOROLAC

30 mg, 1 mL, Route: IVP, Drug form: INJ, ONCE, Dosing Weight 52.6, kg, Priority:
STAT, Start date: 10/24/19 12:42:00 CDT, Stop date: 10/24/19 12:42:00 CDT, 0

Notes: (Same as:Toradol) IV bolus must be given >15 seconds. Give IM 
administration slowly and deeply into the muscle.Not for use > 4 days  *** 
MEDICATION WASTE ***Product Size:  30 mgProduct Wasted:  ___ mg

Start Date: 10/24/19

Stop Date: 10/24/19

Status: Completed



Lactated Ringers (Bolus) IV

1,000 mL, 1,000 ml/hr, Infuse Over: 1 hr, Route: IV, 1,000, Drug form: INJ, ONCE
, Priority: STAT, Dosing Weight 52.6 kg, Start date: 10/24/19 14:07:00 CDT, Stop
date: 10/24/19 14:07:00 CDT, 0

Start Date: 10/24/19

Stop Date: 10/24/19

Status: Completed



Macrobid

100 mg, PO, BID, # 14 cap, 0 Refill(s)

Start Date: 10/24/19

Stop Date: 10/31/19

Status: Ordered



melatonin

3 mg, 1 tab, Route: PO, Drug form: TAB, Bedtime, Dosing Weight 52.6, kg, PRN Ins
omnia, Start date: 10/24/19 17:47:00 CDT, Duration: 30 day, Stop date: 19 
17:46:00 CST, 0

Notes: (Same as: Melatonin)

Start Date: 10/24/19

Stop Date: 10/26/19

Status: Discontinued



morphine Sulfate

4 mg, 1 mL, Route: IVP, Drug form: SOLN, Q4H, Dosing Weight 52.6, kg, PRN Pain S
core 7-10, Start date: 10/24/19 17:47:00 CDT, Duration: 30 day, Stop date:  17:46:00 CST, 0

Notes: (Same as:MORPhine Sulfate)

Start Date: 10/24/19

Stop Date: 10/25/19

Status: Discontinued



morphine Sulfate

4 mg, 1 mL, Route: IVP, Drug form: SOLN, ONCE, Dosing Weight 52.6, kg, Priority:
STAT, Start date: 10/24/19 12:42:00 CDT, Stop date: 10/24/19 12:42:00 CDT, 0

Notes: (Same as:MORPhine Sulfate)

Start Date: 10/24/19

Stop Date: 10/24/19

Status: Completed



morphine Sulfate

2 mg, 0.5 mL, Route: IVP, Drug form: SOLN, Q4H, Dosing Weight 52.6, kg, PRN Pain
Score 7-10, Start date: 10/25/19 19:18:00 CDT, Duration: 30 day, Stop date:  19:17:00 CST, 0

Notes: (Same as:MORPhine Sulfate)

Start Date: 10/25/19

Stop Date: 10/26/19

Status: Discontinued



NS 1,000 mL

1,000 mL, Rate: 125 ml/hr, Infuse over: 8 hr, Route: IV, Dosing Weight 52.6 kg, 
Total Volume: 1,000, Start date: 10/24/19 17:44:00 CDT, Duration: 30 day, Stop d
ate: 19 17:43:00 CST, 1.56, m2, 0

Start Date: 10/24/19

Stop Date: 10/26/19

Status: Discontinued



ondansetron

4 mg, 2 mL, Route: IVP, Drug form: INJ, ONCE, Dosing Weight 52.6, kg, Priority: 
STAT, Start date: 10/24/19 12:42:00 CDT, Stop date: 10/24/19 12:42:00 CDT, 0

Notes: (Same as: Zofran)  *** MEDICATION WASTE ***Product Size:  4 mgProduct Was
maria teresa:  ___ mg

Start Date: 10/24/19

Stop Date: 10/24/19

Status: Completed



Phenergan

25 mg, 1 tab, Route: PO, Drug form: TAB, Q4H, Dosing Weight 53.409, kg, PRN Naus
ea & Vomiting, Start date: 10/25/19 19:20:00 CDT, Duration: 30 day, Stop date: 
19 19:19:00 CST, 0

Notes: (Same as: Phenergan)

Start Date: 10/25/19

Stop Date: 10/26/19

Status: Discontinued



Sodium Chloride 0.9% (Bolus) IV

1,000 mL, 1000 ml/hr, Infuse Over: 1 hr, Route: IV, 1,000, Drug form: INJ, ONCE,
Priority: STAT, Dosing Weight 52.6 kg, Start date: 10/24/19 12:42:00 CDT, Stop 
date: 10/24/19 12:42:00 CDT, 0

Start Date: 10/24/19

Stop Date: 10/24/19

Status: Completed



trazodone

50 mg, 1 tab, Route: PO, Drug form: TAB, Bedtime, Dosing Weight 52.6, kg, PRN In
somnia, Start date: 10/24/19 17:47:00 CDT, Duration: 30 day, Stop date: 19
17:46:00 CST, 0

Notes: (Same As: Desyrel)

Start Date: 10/24/19

Stop Date: 10/26/19

Status: Discontinued



Tylenol

650 mg, 2 tab, Route: PO, Drug form: TAB, Q6H, Dosing Weight 52.6, kg, PRN For T
emp > 100.4 F, Start date: 10/24/19 17:47:00 CDT, Duration: 30 day, Stop date: 
19 17:46:00 CST, 0

Notes: Do not exceed 4 gm/day.  (Same as: Tylenol)

Start Date: 10/24/19

Stop Date: 10/26/19

Status: Discontinued



Tylenol

1,000 mg, 2 tab, Route: PO, Drug form: TAB, ONCE, Dosing Weight 52.6, kg, Priori
ty: STAT, Start date: 10/24/19 13:23:00 CDT, Stop date: 10/24/19 13:23:00 CDT, 0

Notes: Max acetaminophen 4000 mg/day (4 gm/day).  (Same as: Tylenol Extra Streng
th)

Start Date: 10/24/19

Stop Date: 10/24/19

Status: Completed



Results









                    1                   2                   3



                                         Most recent to   



                                         oldest [Reference   



                                         Range]:   

 

                                        8.6 K/CMM 

*HI*

                          (10/24/19 7:16 PM)        7.6 K/CMM 

                          (10/24/19 12:59 PM)        



                                         Neutrophils #   



                                         [1.5-8.1 K/CMM]   

 

                                        1.8 K/CMM 

                          (10/24/19 7:16 PM)        2.2 K/CMM 

                          (10/24/19 12:59 PM)        



                                         Lymphocytes #   



                                         [1.0-5.5 K/CMM]   

 

                                        0.7 K/CMM 

                          (10/24/19 7:16 PM)        0.7 K/CMM 

                          (10/24/19 12:59 PM)        



                                         Monocytes # [0.0-0.8   



                                         K/CMM]   

 

                                        0.0 K/CMM 

                          (10/24/19 7:16 PM)        0.0 K/CMM 

                          (10/24/19 12:59 PM)        



                                         Eosinophils #   



                                         [0.0-0.5 K/CMM]   

 

                                        0.0 K/CMM 

                          (10/24/19 7:16 PM)        0.1 K/CMM 

                          (10/24/19 12:59 PM)        



                                         Basophils # [0.0-0.2   



                                         K/CMM]   

 

                                        Moderate 

*ABN*

                    (10/24/19 7:16 PM)                       



                                         Large Plt [None   



                                         Seen]   

 

                                        0.7 mg/dL 

                    (10/24/19 7:16 PM)                       



                                         Bili Indirect   



                                         [0.0-1.0 mg/dL]   

 

                                        129 mL/min/1.73m2 1

*NA*

                          (10/26/19 4:44 AM)        101 mL/min/1.73m2 2

*NA*

                          (10/25/19 10:32 AM)       36 mL/min/1.73m2 3

*NA*

                                        (10/24/19 12:59 PM) 



                                         eGFR   

 

                                        See Note 

                    (10/24/19 9:19 PM)                       



                                         UDS Note   

 

                                        1.4 

                          (10/25/19 10:32 AM)       1.2 

                          (10/24/19 7:16 PM)         



                                         A/G Ratio [0.7-1.6]   

 

                                        3.6 g/dL 

                          (10/25/19 10:32 AM)       4.5 g/dL 

                          (10/24/19 7:16 PM)         



                                         Albumin Lvl [3.5-5.0   



                                         g/dL]   

 

                                        62 unit/L 

                          (10/25/19 10:32 AM)       86 unit/L 

                          (10/24/19 7:16 PM)         



                                         Alk Phos [   



                                         unit/L]   

 

                                        18 unit/L 

                          (10/25/19 10:32 AM)       23 unit/L 

                          (10/24/19 7:16 PM)         



                                         ALT [0-65 unit/L]   

 

                                        Negative 

*NA*

                    (10/24/19 9:19 PM)                       



                                         U Amph Scr   



                                         [Negative]   

 

                                        241 unit/L 

*HI*

                          (10/25/19 10:32 AM)       384 unit/L 

*HI*

                          (10/24/19 7:16 PM)         



                                         Amylase Lvl [   



                                         unit/L]   

 

                                        Negative 

                    (10/24/19 7:16 PM)                       



                                         PETEY [Negative]   

 

                                        10.7 mEq/L 

                          (10/26/19 4:44 AM)        10.4 mEq/L 

                          (10/25/19 10:32 AM)       16.3 mEq/L 

                                        (10/24/19 12:59 PM) 



                                         AGAP [10.0-20.0   



                                         mEq/L]   

 

                                        15 unit/L 

                          (10/25/19 10:32 AM)       17 unit/L 

                          (10/24/19 7:16 PM)         



                                         AST [0-37 unit/L]   

 

                                        25 

                    (10/25/19 10:32 AM)                       



                                         B/C Ratio [6-25]   

 

                                        Negative 

*NA*

                    (10/24/19 9:19 PM)                       



                                         U Milana Scr   



                                         [Negative]   

 

                                        0.3 % 

                          (10/24/19 7:16 PM)        0.5 % 

                          (10/24/19 12:59 PM)        



                                         Basophils [0.0-1.0   



                                         %]   

 

                                        Negative 

*NA*

                    (10/24/19 9:19 PM)                       



                                         U Benzodiaz Scr   



                                         [Negative]   

 

                                        19 mg/dL 

                          (10/26/19 4:44 AM)        27 mg/dL 

*HI*

                          (10/25/19 10:32 AM)       26 mg/dL 

*HI*

                                        (10/24/19 12:59 PM) 



                                         BUN [7-22 mg/dL]   

 

                                        9.3 mg/dL 

                          (10/26/19 4:44 AM)        7.9 mg/dL 

*LOW*

                          (10/25/19 10:32 AM)       11.7 mg/dL 

*HI*

                                        (10/24/19 12:59 PM) 



                                         Calcium Lvl   



                                         [8.5-10.5 mg/dL]   

 

                                        4.77 

                    (10/24/19 7:16 PM)                       



                                         CHD Risk [4.00-7.30]   

 

                                        186 mg/dL 

                    (10/24/19 7:16 PM)                       



                                         Chol [<=199 mg/dL]   

 

                                        162 unit/L 

                    (10/24/19 12:59 PM)                       



                                         Total CK [   



                                         unit/L]   

 

                                        101 mEq/L 

                          (10/26/19 4:44 AM)        104 mEq/L 

                          (10/25/19 10:32 AM)       96 mEq/L 

                                        (10/24/19 12:59 PM) 



                                         Chloride Lvl [   



                                         mEq/L]   

 

                                        30 mEq/L 

                          (10/26/19 4:44 AM)        27 mEq/L 

                          (10/25/19 10:32 AM)       25 mEq/L 

                                        (10/24/19 12:59 PM) 



                                         CO2 [24-32 mEq/L]   

 

                                        Negative 

*NA*

                    (10/24/19 9:19 PM)                       



                                         U Cocaine Scr   



                                         [Negative]   

 

                                        0.90 mg/dL 

                          (10/26/19 4:44 AM)        1.10 mg/dL 

                          (10/25/19 10:32 AM)       2.60 mg/dL 

*HI*

                                        (10/24/19 12:59 PM) 



                                         Creatinine Lvl   



                                         [0.50-1.40 mg/dL]   

 

                                        0.2 mg/dL 

                    (10/24/19 7:16 PM)                       



                                         Bili Direct [0.0-0.3   



                                         mg/dL]   

 

                                        0.2 % 

                          (10/24/19 7:16 PM)        0.3 % 

                          (10/24/19 12:59 PM)        



                                         Eosinophils [0.0-4.0   



                                         %]   

 

                                        2.6 g/dL 

*LOW*

                          (10/25/19 10:32 AM)       3.6 g/dL 

                          (10/24/19 7:16 PM)         



                                         Globulin [2.7-4.2   



                                         g/dL]   

 

                                        73 mg/dL 

                          (10/26/19 4:44 AM)        84 mg/dL 

                          (10/25/19 10:32 AM)       123 mg/dL 

*HI*

                                        (10/24/19 12:59 PM) 



                                         Glucose Lvl [70-99   



                                         mg/dL]   

 

                                        39.4 % 

*LOW*

                          (10/25/19 4:49 AM)        43.7 % 

                          (10/24/19 7:16 PM)        53.5 % 

                                        (10/24/19 12:59 PM) 



                                         Hct [42.0-54.0 %]   

 

                                        39 mg/dL 

*LOW*

                    (10/24/19 7:16 PM)                       



                                         HDL [>=61 mg/dL]   

 

                                        13.2 g/dL 

*LOW*

                          (10/25/19 4:49 AM)        14.5 g/dL 

                          (10/24/19 7:16 PM)        17.7 g/dL 

                                        (10/24/19 12:59 PM) 



                                         Hgb [14.0-18.0 g/dL]   

 

                                        3.7 mEq/L 

                          (10/26/19 4:44 AM)        3.4 mEq/L 

*LOW*

                          (10/25/19 10:32 AM)       3.3 mEq/L 

*LOW*

                                        (10/24/19 12:59 PM) 



                                         Potassium Lvl   



                                         [3.5-5.1 mEq/L]   

 

                                        134 mg/dL 

*HI*

                    (10/24/19 7:16 PM)                       



                                         LDL (Calculated)   



                                         [<=99 mg/dL]   

 

                                        725 unit/L 

*HI*

                          (10/25/19 10:32 AM)       1201 unit/L 

*HI*

                          (10/24/19 7:16 PM)         



                                         Lipase Lvl [   



                                         unit/L]   

 

                                        16.2 % 

*LOW*

                          (10/24/19 7:16 PM)        20.5 % 

                          (10/24/19 12:59 PM)        



                                         Lymphocytes   



                                         [20.0-40.0 %]   

 

                                        32.7 pg 

*HI*

                          (10/25/19 4:49 AM)        32.2 pg 

*HI*

                          (10/24/19 7:16 PM)        32.4 pg 

*HI*

                                        (10/24/19 12:59 PM) 



                                         MCH [27.0-31.0 pg]   

 

                                        33.5 g/dL 

                          (10/25/19 4:49 AM)        33.1 g/dL 

                          (10/24/19 7:16 PM)        33.0 g/dL 

                                        (10/24/19 12:59 PM) 



                                         MCHC [32.0-36.0   



                                         g/dL]   

 

                                        97.4 fL 

*HI*

                          (10/25/19 4:49 AM)        97.2 fL 

*HI*

                          (10/24/19 7:16 PM)        98.2 fL 

*HI*

                                        (10/24/19 12:59 PM) 



                                         MCV [80.0-94.0 fL]   

 

                                        6.1 % 

                          (10/24/19 7:16 PM)        6.4 % 

                          (10/24/19 12:59 PM)        



                                         Monocytes [2.0-12.0   



                                         %]   

 

                                        7.4 fL 

                          (10/25/19 4:49 AM)        6.9 fL 

*LOW*

                          (10/24/19 7:16 PM)        7.2 fL 

*LOW*

                                        (10/24/19 12:59 PM) 



                                         MPV [7.4-10.4 fL]   

 

                                        138 mEq/L 

                          (10/26/19 4:44 AM)        138 mEq/L 

                          (10/25/19 10:32 AM)       134 mEq/L 

*LOW*

                                        (10/24/19 12:59 PM) 



                                         Sodium Lvl [135-145   



                                         mEq/L]   

 

                                        Positive 

*ABN*

                    (10/24/19 9:19 PM)                       



                                         U Opiate Scr   



                                         [Negative]   

 

                                        Negative 

*NA*

                    (10/24/19 9:19 PM)                       



                                         U Phencyclidine Scr   



                                         [Negative]   

 

                                        300 K/CMM 

                          (10/25/19 4:49 AM)        337 K/CMM 

                          (10/24/19 7:16 PM)        336 K/CMM 

                                        (10/24/19 12:59 PM) 



                                         Platelet [133-450   



                                         K/CMM]   

 

                                        77.2 % 

*HI*

                          (10/24/19 7:16 PM)        72.3 % 

                          (10/24/19 12:59 PM)        



                                         Segs [45.0-75.0 %]   

 

                                        6.2 g/dL 

*LOW*

                          (10/25/19 10:32 AM)       8.1 g/dL 

                          (10/24/19 7:16 PM)         



                                         Total Protein   



                                         [6.4-8.4 g/dL]   

 

                                        4.05 M/CMM 

*LOW*

                          (10/25/19 4:49 AM)        4.50 M/CMM 

*LOW*

                          (10/24/19 7:16 PM)        5.45 M/CMM 

                                        (10/24/19 12:59 PM) 



                                         RBC [4.70-6.10   



                                         M/CMM]   

 

                                        Normal 

                    (10/24/19 7:16 PM)                       



                                         RBC Morph   

 

                                        12.9 % 

                          (10/25/19 4:49 AM)        13.0 % 

                          (10/24/19 7:16 PM)        13.1 % 

                                        (10/24/19 12:59 PM) 



                                         RDW [11.5-14.5 %]   

 

                                        7 mm/hr 

                    (10/24/19 12:59 PM)                       



                                         Sed Rate [0-15   



                                         mm/hr]   

 

                                        0.6 mg/dL 

                          (10/25/19 10:32 AM)       0.9 mg/dL 

                          (10/24/19 7:16 PM)         



                                         Bili Total [0.2-1.3   



                                         mg/dL]   

 

                                        Positive 

*ABN*

                    (10/24/19 9:19 PM)                       



                                         U Cannab Scr   



                                         [Negative]   

 

                                        65 mg/dL 

                    (10/24/19 7:16 PM)                       



                                         Trig [<=149 mg/dL]   

 

                                        Occasional /HPF 

*NA*

                    (10/24/19 9:19 PM)                       



                                         UA Bacteria [None   



                                         Seen /HPF]   

 

                                        Negative 

*NA*

                    (10/24/19 9:19 PM)                       



                                         UA Bili [Negative]   

 

                                        Large 

*ABN*

                    (10/24/19 9:19 PM)                       



                                         UA Blood [Negative]   

 

                                        Luba 

*NA*

                    (10/24/19 9:19 PM)                       



                                         UA Color   

 

                                        Negative mg/dL 

*NA*

                    (10/24/19 9:19 PM)                       



                                         UA Glucose [Negative   



                                         mg/dL]   

 

                                        52 /LPF 

*HI*

                    (10/24/19 9:19 PM)                       



                                         UA Hyal Cast [0-2   



                                         /LPF]   

 

                                        Negative mg/dL 

*NA*

                    (10/24/19 9:19 PM)                       



                                         UA Ketones [Negative   



                                         mg/dL]   

 

                                        Moderate 

*ABN*

                    (10/24/19 9:19 PM)                       



                                         UA Leuk Est   



                                         [Negative]   

 

                                        Few /LPF 

*NA*

                    (10/24/19 9:19 PM)                       



                                         UA Mucus [None Seen   



                                         /LPF]   

 

                                        Negative 

                    (10/24/19 9:19 PM)                       



                                         UA Nitrite   



                                         [Negative]   

 

                                        5.0 

                    (10/24/19 9:19 PM)                       



                                         UA pH [5.0-8.0]   

 

                                        100 mg/dL 

*ABN*

                    (10/24/19 9:19 PM)                       



                                         UA Protein [Negative   



                                         mg/dL]   

 

                                        118 /HPF 

*HI*

                    (10/24/19 9:19 PM)                       



                                         UA RBC [0-2 /HPF]   

 

                                        1.021 

                    (10/24/19 9:19 PM)                       



                                         UA Spec Grav   



                                         [<=1.030]   

 

                                        None Seen 

*NA*

                    (10/24/19 9:19 PM)                       



                                         UA Sq Epi   

 

                                        Moderate 

*ABN*

                    (10/24/19 9:19 PM)                       



                                         UA Turbidity [Clear]   

 

                                        2.0 mg/dL 

*HI*

                    (10/24/19 9:19 PM)                       



                                         UA Urobilinogen   



                                         [0.1-1.0 mg/dL]   

 

                                        63 /HPF 

*HI*

                    (10/24/19 9:19 PM)                       



                                         UA WBC [0-5 /HPF]   

 

                                        10.7 K/CMM 

*HI*

                          (10/25/19 4:49 AM)        11.1 K/CMM 

*HI*

                          (10/24/19 7:16 PM)        10.5 K/CMM 

*HI*

                                        (10/24/19 12:59 PM) 



                                         WBC [3.7-10.4 K/CMM]   

 

                                        13 

*NA*

                    (10/24/19 7:16 PM)                       



                                         VLDL   







1Result Comment: The eGFR is calculated using the CKD-EPI formula. In most 
young, healthy individuals the eGFR will be >90 mL/min/1.73m2. The eGFR declines
with age. An eGFR of 60-89 may be normal in some populations, particularly the 
elderly, for whom the CKD-EPI formula has not been extensively validated. Use of
the eGFR is not recommended in the following populations:



Individuals with unstable creatinine concentrations, including pregnant patients
and those with serious co-morbid conditions.



Patients with extremes in muscle mass or diet. 



The data above are obtained from the National Kid[employer] Education Program (
NK[employer]) which additionally recommends that when the eGFR is used in patients with
extremes of body mass index for purposes of drug dosing, the eGFR should be mul
tiplied by the estimated BMI.



2Result Comment: The eGFR is calculated using the CKD-EPI formula. In most 
young, healthy individuals the eGFR will be >90 mL/min/1.73m2. The eGFR declines
with age. An eGFR of 60-89 may be normal in some populations, particularly the 
elderly, for whom the CKD-EPI formula has not been extensively validated. Use of
the eGFR is not recommended in the following populations:



Individuals with unstable creatinine concentrations, including pregnant patients
and those with serious co-morbid conditions.



Patients with extremes in muscle mass or diet. 



The data above are obtained from the National Kid[employer] Education Program (
[employer]) which additionally recommends that when the eGFR is used in patients with
extremes of body mass index for purposes of drug dosing, the eGFR should be mul
tiplied by the estimated BMI.



3Result Comment: The eGFR is calculated using the CKD-EPI formula. In most 
young, healthy individuals the eGFR will be >90 mL/min/1.73m2. The eGFR declines
with age. An eGFR of 60-89 may be normal in some populations, particularly the 
elderly, for whom the CKD-EPI formula has not been extensively validated. Use of
the eGFR is not recommended in the following populations:



Individuals with unstable creatinine concentrations, including pregnant patients
and those with serious co-morbid conditions.



Patients with extremes in muscle mass or diet. 



The data above are obtained from the National Kid[employer] Education Program (
[employer]) which additionally recommends that when the eGFR is used in patients with
extremes of body mass index for purposes of drug dosing, the eGFR should be mul
tiplied by the estimated BMI.



Microbiology Reports







TEST: Culture: Urine

STATUS: Auth (Verified)

BODY SITE: 

SOURCE: Urine, Clean Catch

COLLECTED DATE/TIME: 10/24/19 9:34 PM



***FINAL REPORT***



<10,000 CFU/mL Skin Natasha



Immunizations





No data available for this section



Procedures





No data available for this section



Social History





 



                           Social History Type       Response

 

 



                           Smoking Status            Current some day smoker; Ex

posure to Tobacco Smoke None; 

Cigarette Smoking



                                         Last 365 Days No; Reg Smoking Cessation

 Counseling No



                                         entered on: 10/24/19







Assessment and Plan

Extracted from:



  



                     Title: Progress note  Author: Boogie Lang  Da

te: 10/25/19



                                         MD 







                                         Impression and Plan

Acute kidney injury with creatinine 2.6



resolved

Acute pancreatitis

Hyponatremia and hypokalemia possibly due to vomiting

Dehydration with volume depletion



Plan: Continue monitoring patient's amylase and lipase.  Continue rehydration.  
Patient could be discharged with oral pain medication provided weekend.





Extracted from:



  



                     Title: General Admission H&P *  Author: Christie Lang  Date: 

10/24/19



                                         MD 







                                         Impression and Plan

Acute kidney injury with creatinine 2.6

Acute pancreatitis

Hyponatremia and hypokalemia possibly due to vomiting

Dehydration with volume depletion



Plan: Patient is being admitted for further evaluation.  I will continue patient
with normal saline at 125 cc an hour pain management.  Get urinalysis and urine 
drug screen.  Get lipid panel.  Repeat labs in the morning.  Switch patient to 
inpatient status

## 2020-05-23 NOTE — XMS REPORT
Continuity of Care Document

                             Created on: 2020



JAIDEN VILLASENOR

External Reference #: [id number]

: 1985

Sex: Male



Demographics





                          Address                   [address]

[place]  [address]

 

                          Home Phone                [phone]

 

                          Preferred Language        English

 

                          Marital Status            Unknown

 

                          Worship Affiliation     Unknown

 

                          Race                      Unknown

 

                          Ethnic Group              Unknown





Author





                          Author                    [employer]

 JAIDEN Willis              [employer]

 

                          Address                   Unknown

 

                          Phone                     Unavailable







Care Team Providers





                    Care Team Member Name Role                Phone

 

                    [employer] Unavailable         Un

available



                                    



Problems

                    



                    Problem                         Status                      

   Onset Date       

                          Classification                         Date Reported  

         

                          Comments                         Source               

     

 

                    SYNCOPE/VOMITING                         Active             

            

10/24/2019                                                                      

 

                                                    [facility]                

    

 

                    BROKEN ARM                         Active                   

      10/25/2016    

                                                                                

                                        [facility]                    

 

                          Acute kidney failure, unspecified                     

                          

                                                                      10/28/2019

                  

                                                    [facility]                

    

 

                          ACUTE KIDNEY FAILURE, UNSPECIFIED                     

    Active                

                                                                                

            

                                                    [facility]                

    

 

                    DEHYDRATION                         Active                  

                    

                                                                                

         

                                        [facility]                    



                                                                                
                                                       



Medications

                    



                    Medication                         Details                  

       Route        

                          Status                         Patient Instructions   

          

                          Ordering Provider                         Order Date  

               

                                        Source                    

 

                          Phenergan                         Notes: (Same as: Phe

nergan)                   

                                             No Longer Active                   

               

                                             10/26/2019                         

[facility]                    

 

                          Morphine                         Notes: (Same as:MORPh

ine Sulfate)              

                                             No Longer Active                   

          

                                             10/26/2019                         

[facility]                    

 

                          Dilaudid                         Notes: Same as Dilaud

id                        

                                             No Longer Active                   

                    

                                             10/26/2019                         

[facility]                    

 

                          Macrobid                         100 mg, PO, BID, # 14

 cap, 0 Refill(s)         

                                             Active                             

     

                                             10/25/2019                         

[facility]                    

 

                          Melatonin                         Notes: (Same as: Radha

atonin)                   

                                             No Longer Active                   

               

                                             10/24/2019                         

[facility]                    

 

                          Trazodone                         Notes: (Same As: Jorge

yrel)                     

                                             No Longer Active                   

                 

                                             10/24/2019                         

[facility]                    

 

                          Tylenol                         Notes: Do not exceed 4

 gm/day.  (Same as: 

Tylenol)                                                   No Longer Active     

 

                                                                      10/24/2019

  

                                        [facility]                    

 

                          Morphine                         Notes: (Same as:MORPh

ine Sulfate)              

                                             No Longer Active                   

          

                                             10/24/2019                         

[facility]                    

 

                          Dilaudid                         Notes: Same as Dilaud

id                        

                                             No Longer Active                   

                    

                                             10/24/2019                         

[facility]                    

 

                          Benadryl                         Notes: (Same as: [place]

dryl)                     

                                             No Longer Active                   

                 

                                             10/24/2019                         

[facility]                    

 

                          NS 1,000 mL                         1,000 mL, Rate: 12

5 ml/hr, Infuse over: 8 

hr, Route: IV, Dosing Weight 52.6 kg, Total Volume: 1,000, Start date: 10/24/19 
17:44:00 CDT, Duration: 30 day, Stop date: 19 17:43:00 CST, 1.56, m2, 0   
                                                    No Longer Active            

      

                                                                      10/24/2019

              

                                        [facility]                    

 

                                        Calcium Chloride 0.0014 MEQ/ML / Potassi

um Chloride 0.004 MEQ/ML / Sodium 

Chloride 0.103 MEQ/ML / Sodium Lactate 0.028 MEQ/ML Injectable Solution         
                                        1,000 mL, 1,000 ml/hr, Infuse Over: 1 hr

, Route: IV, 1,000, Drug 

form: INJ, ONCE, Priority: STAT, Dosing Weight 52.6 kg, Start date: 10/24/19 
14:07:00 CDT, Stop date: 10/24/19 14:07:00 CDT, 0                               

                    Inactive                                                    

  

                          10/24/2019                         [facility]       

      

      

 

                          Tylenol                         Notes: Max acetaminoph

en 4000 mg/day (4 gm/day).

 (Same as: Tylenol Extra Strength)                                              

                    Inactive                                                    

                 

                          10/24/2019                         [facility]       

             

 

                          Sodium Chloride 0.9% (Bolus) IV                       

  1,000 mL, 1000 ml/hr, 

Infuse Over: 1 hr, Route: IV, 1,000, Drug form: INJ, ONCE, Priority: STAT, 
Dosing Weight 52.6 kg, Start date: 10/24/19 12:42:00 CDT, Stop date: 10/24/19 
12:42:00 CDT, 0                                                   Inactive      

 

                                                                      10/24/2019

   

                                        [facility]                    

 

                          Morphine                         Notes: (Same as:MORPh

ine Sulfate)              

                                             Inactive                           

          

                                             10/24/2019                         

[facility]                    

 

                          Ketorolac                          4 days   *** MEDICA

TION WASTE *** Product 

Size:  30 mg Product Wasted:  ___ mg                                            

                    Inactive                                                    

               

                          10/24/2019                         [facility]       

             

 

                          Ondansetron                         Notes: (Same as: [place])   *** MEDICATION 

WASTE *** Product Size:  4 mg Product Wasted:  ___ mg                           

                      Inactive                                                  

                          10/24/2019                         [facility]       

  

          



                                                                                
                                                                                
                                                                                
                                                                                
                



Allergies, Adverse Reactions, Alerts

                    



                    Substance                         Category                  

       Reaction     

                          Severity                         Reaction type        

       

                    Status                         Date Reported                

         

Comments                                Source                    

 

                          No Known Medication Allergies                         

Assertion                 

                                                                      Drug aller

gy           

                                                                                

       

                                        [facility]                    



                                                        



Immunizations

        



                                        No Data Provided for This Section



                                    



Results





                    Order Name                         Results                  

       Value        

                          Reference Range                         Date          

          

                    Interpretation                         Comments             

            

Source                    

 

                CHEM PANEL                         Glucose Lvl     73           

              70 - 

99                         10/26/2019                                           

                                                    [facility]                

    

 

                CHEM PANEL                         BUN             19           

              7 - 22        

                    10/26/2019                                                  

    

                                        [facility]                    

 

                CHEM PANEL                         Creatinine Lvl  0.90         

                

0.50 - 1.40                         10/26/2019                                  

                                                    [facility]                

    

 

                CHEM PANEL                         Sodium Lvl      138          

               135 - 

145                         10/26/2019                                          

                                                    [facility]                

    

 

                CHEM PANEL                         Potassium Lvl   3.7          

               3.5

- 5.1                         10/26/2019                                        

                                                    [facility]                

    

 

                CHEM PANEL                         Chloride Lvl    101          

               95 -

109                         10/26/2019                                          

                                                     [place]                

    

 

                CHEM PANEL                         CO2             30           

              24 - 32       

                    10/26/2019                                                  

   

                                        [facility]                    

 

                CHEM PANEL                         AGAP            10.7         

                10.0 - 20.0

                          10/26/2019                                            

  

                                                    [facility]                

    

 

                CHEM PANEL                         Calcium Lvl     9.3          

               8.5 -

10.5                         10/26/2019                                         

                                                    [facility]                

    

 

                CHEM PANEL                         eGFR            129          

                           

                    10/26/2019                                                  

Result 

Comment: The eGFR is calculated using the CKD-EPI formula. In most young, 
healthy individuals the eGFR will be >90 mL/min/1.73m2. The eGFR declines with 
age. An eGFR of 60-89 may be normal in some populations, particularly the 
elderly, for whom the CKD-EPI formula has not been extensively validated. Use of
the eGFR is not recommended in the following populations:<br/><br/>Individuals 
with unstable creatinine concentrations, including pregnant patients and those 
with serious co-morbid conditions.<br/><br/>Patients with extremes in muscle 
mass or diet. <br/><br/>The data above are obtained from the National Kidney 
Disease Education Program (NKDEP) which additionally recommends that when the 
eGFR is used in patients with extremes of body mass index for purposes of drug 
dosing, the eGFR should be multiplied by the estimated BMI.                     
                                        [facility]                    

 

                CHEM PANEL                         Glucose Lvl     84           

              70 - 

99                         10/25/2019                                           

                                                    [facility]                

    

 

                CHEM PANEL                         BUN             27           

              7 - 22        

                    10/25/2019                                                  

    

                                        [facility]                    

 

                CHEM PANEL                         Creatinine Lvl  1.10         

                

0.50 - 1.40                         10/25/2019                                  

                                                    [facility]                

    

 

                CHEM PANEL                         Sodium Lvl      138          

               135 - 

145                         10/25/2019                                          

                                                    [facility]                

    

 

                CHEM PANEL                         Potassium Lvl   3.4          

               3.5

- 5.1                         10/25/2019                                        

                                                    [facility]                

    

 

                CHEM PANEL                         Chloride Lvl    104          

               95 -

109                         10/25/2019                                          

                                                    [facility]                

    

 

                CHEM PANEL                         CO2             27           

              24 - 32       

                    10/25/2019                                                  

   

                                        [facility]                    

 

                CHEM PANEL                         AGAP            10.4         

                10.0 - 20.0

                          10/25/2019                                            

  

                                                    [facility]                

    

 

                CHEM PANEL                         Calcium Lvl     7.9          

               8.5 -

10.5                         10/25/2019                                         

                                                    [facility]                

    

 

                CHEM PANEL                         B/C Ratio       25           

              6 - 25  

                          10/25/2019                                            

    

                                                    [facility]                

    

 

                CHEM PANEL                         Total Protein   6.2          

               6.4

- 8.4                         10/25/2019                                        

                                                    [facility]                

    

 

                CHEM PANEL                         Albumin Lvl     3.6          

               3.5 -

5.0                         10/25/2019                                          

                                                    [facility]                

    

 

                CHEM PANEL                         Globulin        2.6          

               2.7 - 

4.2                         10/25/2019                                          

                                                    [facility]                

    

 

                CHEM PANEL                         A/G Ratio       1.4          

               0.7 - 

1.6                         10/25/2019                                          

                                                    [facility]                

    

 

                CHEM PANEL                         ALT             18           

              0 - 65        

                    10/25/2019                                                  

    

                                        [facility]                    

 

                CHEM PANEL                         AST             15           

              0 - 37        

                    10/25/2019                                                  

    

                                        [facility]                    

 

                CHEM PANEL                         Alk Phos        62           

              39 - 136 

                          10/25/2019                                            

   

                                                    [facility]                

    

 

                CHEM PANEL                         Bili Total      0.6          

               0.2 - 

1.3                         10/25/2019                                          

                                                    [facility]                

    

 

                CHEM PANEL                         eGFR            101          

                           

                    10/25/2019                                                  

Result 

Comment: The eGFR is calculated using the CKD-EPI formula. In most young, 
healthy individuals the eGFR will be >90 mL/min/1.73m2. The eGFR declines with 
age. An eGFR of 60-89 may be normal in some populations, particularly the 
elderly, for whom the CKD-EPI formula has not been extensively validated. Use of
the eGFR is not recommended in the following populations:<br/><br/>Individuals 
with unstable creatinine concentrations, including pregnant patients and those 
with serious co-morbid conditions.<br/><br/>Patients with extremes in muscle 
mass or diet. <br/><br/>The data above are obtained from the National Kidney 
Disease Education Program (NKDEP) which additionally recommends that when the 
eGFR is used in patients with extremes of body mass index for purposes of drug 
dosing, the eGFR should be multiplied by the estimated BMI.                     
                                        [facility]                    

 

                CHEM PANEL                         Amylase Lvl     241          

               25 - 

115                         10/25/2019                                          

                                                    [facility]                

    

 

                CHEM PANEL                         Lipase Lvl      725          

               73 - 

393                         10/25/2019                                          

                                                    [facility]                         WBC             10.7         

                3.7 - 10.4  

                          10/25/2019                                            

    

                                                    [facility]                

    

 

                HEMATOLOGY                         RBC             4.05         

                4.70 - 6.10 

                          10/25/2019                                            

   

                                                    [facility] [place]                

    

 

                HEMATOLOGY                         Hgb             13.2         

                14.0 - 18.0 

                          10/25/2019                                            

   

                                                    [facility] [place]                

    

 

                HEMATOLOGY                         Hct             39.4         

                42.0 - 54.0 

                          10/25/2019                                            

   

                                                    [facility] [place]                

    

 

                HEMATOLOGY                         MCV             97.4         

                80.0 - 94.0 

                          10/25/2019                                            

   

                                                    [facility] [place]                

    

 

                HEMATOLOGY                         MCH             32.7         

                27.0 - 31.0 

                          10/25/2019                                            

   

                                                    [facility]                         MCHC            33.5         

                32.0 - 36.0

                          10/25/2019                                            

  

                                                    [facility]                         RDW             12.9         

                11.5 - 14.5 

                          10/25/2019                                            

   

                                                    [facility]                         Platelet        300          

               133 - 

450                         10/25/2019                                          

                                                    [facility]                         MPV             7.4          

               7.4 - 10.4   

                          10/25/2019                                            

     

                                                    [facility] [place]                

    

 

                                             Culture: Urine      <10,000 CFU/mL 

Skin Natasha               

                                             10/25/2019                         

           

                                                    [facility] [place]                

    

 

                    DRUG SCREEN                         U Amph Scr          Nega

tive 

*NA*

                    (10/24/19 9:19 PM)                         Negative         

                

10/25/2019                                                                      

 

                                        [facility] [place]                    

 

                    DRUG SCREEN                         U Milana Scr          Nega

tive 

*NA*

                    (10/24/19 9:19 PM)                         Negative         

                

10/25/2019                                                                      

 

                                        [facility] [place]                    

 

                    DRUG SCREEN                         U Benzodiaz Scr     Nega

tive 

*NA*

                    (10/24/19 9:19 PM)                         Negative         

                

10/25/2019                                                                      

 

                                        [facility] [place]                    

 

                    DRUG SCREEN                         U Cannab Scr        Posi

tive 

*ABN*

                    (10/24/19 9:19 PM)                         Negative         

                

10/25/2019                                                                      

 

                                        [facility] [place]                    

 

                    DRUG SCREEN                         U Cocaine Scr       Nega

tive 

*NA*

                    (10/24/19 9:19 PM)                         Negative         

                

10/25/2019                                                                      

 

                                        [facility] [place]                    

 

                    DRUG SCREEN                         U Opiate Scr        Posi

tive 

*ABN*

                    (10/24/19 9:19 PM)                         Negative         

                

10/25/2019                                                                      

 

                                        [facility] [place]                    

 

                    DRUG SCREEN                         U Phencyclidine Scr Nega

tive 

*NA*

                    (10/24/19 9:19 PM)                         Negative         

                

10/25/2019                                                                      

 

                                        [facility] [place]                    

 

                    DRUG SCREEN                         UDS Note            See 

Note 

                    (10/24/19 9:19 PM)                                          

        10/25/2019  

                                                                        [facility] [place]                    

 

                    URINE AND STOOL                         UA Turbidity        

Moderate 

*ABN*

                    (10/24/19 9:19 PM)                         Clear            

             

10/25/2019                                                                      

 

                                        [facility] [place]                    

 

                    URINE AND STOOL                         UA Spec Grav        

1.021                      

                    <=1.030                         10/25/2019                  

                  

                                                    [facility] [place]                

    

 

                URINE AND STOOL                         UA pH           5.0     

                    5.0 - 

8.0                         10/25/2019                                          

                                                    [facility] [place]                

    

 

                    URINE AND STOOL                         UA Protein          

100 mg/dL                    

                    Negative mg/dL                         10/25/2019           

                

                                                    [facility] [place]                

    

 

                    URINE AND STOOL                         UA Glucose          

Negative mg/dL               

                          Negative mg/dL                         10/25/2019     

                 

                                                                      [facility]                

   

 

                    URINE AND STOOL                         UA Ketones          

Negative mg/dL               

                          Negative mg/dL                         10/25/2019     

                 

                                                                      [facility]                

   

 

                    URINE AND STOOL                         UA Bili             

Negative 

*NA*

                    (10/24/19 9:19 PM)                         Negative         

                

10/25/2019                                                                      

 

                                        [facility] [place]                    

 

                    URINE AND STOOL                         UA Blood            

Large 

*ABN*

                    (10/24/19 9:19 PM)                         Negative         

                

10/25/2019                                                                      

 

                                        [facility] [place]                    

 

                    URINE AND STOOL                         UA Urobilinogen     

2.0                     

                    0.1 - 1.0                         10/25/2019                

                 

                                                    [facility] [place]                

    

 

                    URINE AND STOOL                         UA Nitrite          

Negative 

                    (10/24/19 9:19 PM)                         Negative         

                

10/25/2019                                                                      

 

                                        [facility] [place]                    

 

                    URINE AND STOOL                         UA Leuk Est         

Moderate 

*ABN*

                    (10/24/19 9:19 PM)                         Negative         

                

10/25/2019                                                                      

 

                                        [facility] [place]                    

 

                URINE AND STOOL                         UA WBC          63      

                   0 - 5 

                          10/25/2019                                            

   

                                                    [facility] [place]                

    

 

                URINE AND STOOL                         UA RBC          118     

                    0 - 2

                          10/25/2019                                            

  

                                                    [facility] [place]                

    

 

                    URINE AND STOOL                         UA Bacteria         

Occasional /HPF             

                          None Seen /HPF                         10/25/2019     

               

                                                                      [facility]              

     

 

                    URINE AND STOOL                         UA Mucus            

Few /LPF                       

                    None Seen /LPF                         10/25/2019           

                   

                                                    [facility] [place]                

    

 

                URINE AND STOOL                         UA Hyal Cast    52      

                   

0 - 2                         10/25/2019                                        

                                                    [facility] [place]                

    

 

                    URINE AND STOOL                         UA Sq Epi           

None Seen                     

                                             10/25/2019                         

                 

                                                    [facility] [place]                

    

 

                URINE AND STOOL                         UA Color        Luba   

                       

                          10/25/2019                                            

   

                                                    [facility] [place]                

    

 

                CHEM PANEL                         Lipase Lvl      1201         

                73 - 

393                         10/25/2019                                          

                                                    [facility] [place]                

    

 

                CHEM PANEL                         Amylase Lvl     384          

               25 - 

115                         10/25/2019                                          

                                                    [facility] [place]                

    

 

                CHEM PANEL                         Total Protein   8.1          

               6.4

- 8.4                         10/25/2019                                        

                                                    [facility] [place]                

    

 

                CHEM PANEL                         Albumin Lvl     4.5          

               3.5 -

5.0                         10/25/2019                                          

                                                    [facility] [place]                

    

 

                CHEM PANEL                         ALT             23           

              0 - 65        

                    10/25/2019                                                  

    

                                        [facility] [place]                    

 

                CHEM PANEL                         AST             17           

              0 - 37        

                    10/25/2019                                                  

    

                                        [facility] [place]                    

 

                CHEM PANEL                         Alk Phos        86           

              39 - 136 

                          10/25/2019                                            

   

                                                    [facility] [place]                

    

 

                CHEM PANEL                         Bili Total      0.9          

               0.2 - 

1.3                         10/25/2019                                          

                                                    [facility] [place]                

    

 

                CHEM PANEL                         Bili Direct     0.2          

               0.0 -

0.3                         10/25/2019                                          

                                                    [facility] [place]                

    

 

                CHEM PANEL                         Bili Indirect   0.7          

               0.0

- 1.0                         10/25/2019                                        

                                                    [facility] [place]                

    

 

                CHEM PANEL                         Globulin        3.6          

               2.7 - 

4.2                         10/25/2019                                          

                                                    [facility] [place]                

    

 

                CHEM PANEL                         A/G Ratio       1.2          

               0.7 - 

1.6                         10/25/2019                                          

                                                    [facility] [place]                

    

 

                HEMATOLOGY                         WBC             11.1         

                3.7 - 10.4  

                          10/25/2019                                            

    

                                                    [facility] [place]                

    

 

                HEMATOLOGY                         RBC             4.50         

                4.70 - 6.10 

                          10/25/2019                                            

   

                                                    [facility] [place]                

    

 

                HEMATOLOGY                         Hgb             14.5         

                14.0 - 18.0 

                          10/25/2019                                            

   

                                                    [facility] [place]                

    

 

                HEMATOLOGY                         Hct             43.7         

                42.0 - 54.0 

                          10/25/2019                                            

   

                                                    [facility] [place]                

    

 

                HEMATOLOGY                         MCV             97.2         

                80.0 - 94.0 

                          10/25/2019                                            

   

                                                    [facility] [place]                

    

 

                HEMATOLOGY                         MCH             32.2         

                27.0 - 31.0 

                          10/25/2019                                            

   

                                                    [facility] [place]                

    

 

                HEMATOLOGY                         MCHC            33.1         

                32.0 - 36.0

                          10/25/2019                                            

  

                                                    [facility] [place]                

    

 

                HEMATOLOGY                         RDW             13.0         

                11.5 - 14.5 

                          10/25/2019                                            

   

                                                    [facility] [place]                

    

 

                HEMATOLOGY                         Platelet        337          

               133 - 

450                         10/25/2019                                          

                                                    [facility]                         MPV             6.9          

               7.4 - 10.4   

                          10/25/2019                                            

     

                                                    [facility]                         RBC Morph           Alexandrea

l 

                    (10/24/19 7:16 PM)                                          

        10/25/2019  

                                                                        [facility] [place]                    

 

                    HEMATOLOGY                         Large Plt           Moder

ate 

*ABN*

                    (10/24/19 7:16 PM)                         None Seen        

                 

10/25/2019                                                                      

 

                                        [facility] [place]                    

 

                HEMATOLOGY                         Segs            77.2         

                45.0 - 75.0

                          10/25/2019                                            

  

                                                    [facility]                         Lymphocytes     16.2         

                20.0

- 40.0                         10/25/2019                                       

                                                    [facility] [place]                

    

 

                HEMATOLOGY                         Monocytes       6.1          

               2.0 - 

12.0                         10/25/2019                                         

                                                    [facility] [place]                

    

 

                HEMATOLOGY                         Eosinophils     0.2          

               0.0 -

4.0                         10/25/2019                                          

                                                    [facility] [place]                

    

 

                HEMATOLOGY                         Basophils       0.3          

               0.0 - 

1.0                         10/25/2019                                          

                                                    [facility] [place]                

    

 

                HEMATOLOGY                         Neutrophils #   8.6          

               1.5

- 8.1                         10/25/2019                                        

                                                    [facility]                         Lymphocytes #   1.8          

               1.0

- 5.5                         10/25/2019                                        

                                                    [facility]                         Monocytes #     0.7          

               0.0 -

0.8                         10/25/2019                                          

                                                    [facility] [place]                

    

 

                HEMATOLOGY                         Eosinophils #   0.0          

               0.0

- 0.5                         10/25/2019                                        

                                                    [facility] [place]                

    

 

                HEMATOLOGY                         Basophils #     0.0          

               0.0 -

0.2                         10/25/2019                                          

                                                    [facility] [place]                

    

 

                    IMMUNOLOGY                         PETEY                 Negat

robin 

                    (10/24/19 7:16 PM)                         Negative         

                

10/25/2019                                                                      

 

                                        [facility] [place]                    

 

                LIPIDS                         Trig            65               

          <=149 mg/dL      

                    10/25/2019                                                  

  

                                        [facility] [place]                    

 

                LIPIDS                         Chol            186              

           <=199 mg/dL     

                    10/25/2019                                                  

 

                                        [facility] [place]                    

 

                LIPIDS                         HDL             39               

          >=61 mg/dL        

                    10/25/2019                                                  

    

                                        [facility] [place]                    

 

                LIPIDS                         LDL (Calculated) 134             

            <=99

mg/dL                         10/25/2019                                        

                                                    [facility] [place]                

    

 

                LIPIDS                         VLDL            13               

                           

                    10/25/2019                                                  

             

                                        [facility]                    

 

                LIPIDS                         CHD Risk        4.77             

            4.00 - 7.30

                          10/25/2019                                            

  

                                                    [facility]                

    

 

                CARDIAC ENZYMES                         Total CK        162     

                    12 

- 191                         10/24/2019                                        

                                                    [facility]                

    

 

                CHEM PANEL                         Glucose Lvl     123          

               70 - 

99                         10/24/2019                                           

                                                    [facility]                

    

 

                CHEM PANEL                         BUN             26           

              7 - 22        

                    10/24/2019                                                  

    

                                        [facility]                    

 

                CHEM PANEL                         Creatinine Lvl  2.60         

                

0.50 - 1.40                         10/24/2019                                  

                                                    [facility]                

    

 

                CHEM PANEL                         Sodium Lvl      134          

               135 - 

145                         10/24/2019                                          

                                                    [facility]                

    

 

                CHEM PANEL                         Potassium Lvl   3.3          

               3.5

- 5.1                         10/24/2019                                        

                                                    [facility]                

    

 

                CHEM PANEL                         Chloride Lvl    96           

              95 - 

109                         10/24/2019                                          

                                                    [facility]                

    

 

                CHEM PANEL                         CO2             25           

              24 - 32       

                    10/24/2019                                                  

   

                                        [facility]                    

 

                CHEM PANEL                         Calcium Lvl     11.7         

                8.5 

- 10.5                         10/24/2019                                       

                                                    [facility]                

    

 

                CHEM PANEL                         eGFR            36           

                           

                    10/24/2019                                                  

Result 

Comment: The eGFR is calculated using the CKD-EPI formula. In most young, 
healthy individuals the eGFR will be >90 mL/min/1.73m2. The eGFR declines with 
age. An eGFR of 60-89 may be normal in some populations, particularly the 
elderly, for whom the CKD-EPI formula has not been extensively validated. Use of
the eGFR is not recommended in the following populations:<br/><br/>Individuals 
with unstable creatinine concentrations, including pregnant patients and those 
with serious co-morbid conditions.<br/><br/>Patients with extremes in muscle 
mass or diet. <br/><br/>The data above are obtained from the National Kidney 
Disease Education Program (NKDEP) which additionally recommends that when the 
eGFR is used in patients with extremes of body mass index for purposes of drug 
dosing, the eGFR should be multiplied by the estimated BMI.                     
                                        [facility]                    

 

                CHEM PANEL                         AGAP            16.3         

                10.0 - 20.0

                          10/24/2019                                            

  

                                                    [facility]                

    

 

                HEMATOLOGY                         Sed Rate        7            

             0 - 15    

                    10/24/2019                                                  

                                        [facility]                         WBC             10.5         

                3.7 - 10.4  

                          10/24/2019                                            

    

                                                    [facility]                

    

 

                HEMATOLOGY                         RBC             5.45         

                4.70 - 6.10 

                          10/24/2019                                            

   

                                                    [facility]                

    

 

                HEMATOLOGY                         Hgb             17.7         

                14.0 - 18.0 

                          10/24/2019                                            

   

                                                    [facility]                

    

 

                HEMATOLOGY                         Hct             53.5         

                42.0 - 54.0 

                          10/24/2019                                            

   

                                                    [facility]                

    

 

                HEMATOLOGY                         MCV             98.2         

                80.0 - 94.0 

                          10/24/2019                                            

   

                                                    [facility]                         MCH             32.4         

                27.0 - 31.0 

                          10/24/2019                                            

   

                                                    [facility]                         MCHC            33.0         

                32.0 - 36.0

                          10/24/2019                                            

  

                                                    [facility]                         RDW             13.1         

                11.5 - 14.5 

                          10/24/2019                                            

   

                                                    [facility]                         Platelet        336          

               133 - 

450                         10/24/2019                                          

                                                    [facility]                         MPV             7.2          

               7.4 - 10.4   

                          10/24/2019                                            

     

                                                    [facility]                         Segs            72.3         

                45.0 - 75.0

                          10/24/2019                                            

  

                                                    [facility]                         Lymphocytes     20.5         

                20.0

- 40.0                         10/24/2019                                       

                                                    [facility]                         Monocytes       6.4          

               2.0 - 

12.0                         10/24/2019                                         

                                                    [facility]                         Eosinophils     0.3          

               0.0 -

4.0                         10/24/2019                                          

                                                    [facility]                         Basophils       0.5          

               0.0 - 

1.0                         10/24/2019                                          

                                                    [facility]                         Neutrophils #   7.6          

               1.5

- 8.1                         10/24/2019                                        

                                                    [facility]                         Lymphocytes #   2.2          

               1.0

- 5.5                         10/24/2019                                        

                                                    [facility]                         Monocytes #     0.7          

               0.0 -

0.8                         10/24/2019                                          

                                                    [facility]                         Eosinophils #   0.0          

               0.0

- 0.5                         10/24/2019                                        

                                                    [facility]                         Basophils #     0.1          

               0.0 -

0.2                         10/24/2019                                          

                                                    [facility]                

    



                                                                                
                                                                                
                                                                                
                                                                                
                                                                                
                                                                                
                                                                                
                                                                                
                                                                                
                                                                                
                                                                                
                                                                                
                                                                                
                                                                                
                                                                                
                                                                  



Pathology Reports





                                        No Data Provided for This Section       

             



                                                



Diagnostic Reports

                    



                    Report                         Value                        

 Date               

                                        Source                    

 

                          Abdomen/Pelvis wo IV contrast CT                      

   Radiation Dose CTDIVOL 

= 0 (mGy): DLP = 178.9 (mGy-cm)

PROCEDURE INFORMATION: 

Exam: CT Abdomen And Pelvis Without Contrast 

Exam date and time: 10/24/2019 2:11 PM 

Clinical history: 34 years old, male; Abdominal pain; Flank; Additional info: 

/hx kidney stone 

TECHNIQUE: 

Imaging protocol: Computed tomography of the abdomen and pelvis without 

contrast. 

Total DLP: 178.91 mGy-cm 

Radiation optimization: All CT scans at this facility use at least one of these 

dose optimization techniques: automated exposure control; mA and/or kV 

adjustment per patient size (includes targeted exams where dose is matched to 

clinical indication); or iterative reconstruction. 

COMPARISON: 

No relevant prior studies available. 

FINDINGS: 

Liver: Normal. No mass. 

Gallbladder and bile ducts: Normal. No calcified stones. No ductal dilation. 

Pancreas: Normal. No ductal dilation. 

Spleen: Normal. No splenomegaly. 

Adrenals: Normal. No mass. 

Kidneys and ureters: No radiopaque renal calculi. No hydronephrosis or 

hydroureter. 

Stomach and bowel: Unremarkable. No obstruction. No mucosal thickening. 

Appendix: No evidence of appendicitis. 

Intraperitoneal space: Unremarkable. No free air. No significant fluid 

collection. 

Vasculature: The abdominal aorta appears normal in course without focal 

aneurysmal dilation. Mild to moderate atherosclerotic calcifications present at 

the infrarenal abdominal aorta. 

Lymph nodes: Unremarkable. No enlarged lymph nodes. 

Bladder: The bladder is collapsed, limiting its evaluation. 

Reproductive: Unremarkable as visualized. 

Bones/joints: Unremarkable. No acute fracture. 

Soft tissues: Unremarkable. 

IMPRESSION: 

                                        1.  No acute inflammatory process identi

fied within the abdomen or pelvis.  No 

radiopaque renal calculi, hydronephrosis, or hydroureter.

Russell Flood MD On 10/24/2019  14:35:36; -[id number]

                          10/24/2019                         [facility]       

 

           

 

                          Hand 3 views DX                         Patient Name: 

JAIDEN VILLASENOR

: 1985; Age: 31 years  y/o Male

MR: [id number]

Study: 3 view examination of the right hand dated 10/25/2016.

Clinical Indication: Right hand pain Post Trauma;    

Comparison: None

There is nondisplaced fracture of the radial cortex of the base/proximal shaft 
junction of the right 4th metacarpal. No other fracture or dislocation.



SL: [id number]

                          10/25/2016                         [facility]       

 

           



                                                                                
   



Consultation Notes

                    



                                        No Data Provided for This Section       

             



                                                            



Discharge Summaries

                    



                                        No Data Provided for This Section       

             



                                                            



History and Physicals

                    



                                        No Data Provided for This Section       

             



                                                                



Vital Signs

                     



                    Vital Sign                         Value                    

     Date           

                          Comments                         Source               

     

 

                    Temperature Oral (F)                         98.2 F         

                

10/26/2019                                                   [facility]       

 

           

 

                    Heart Rate                         69                       

   10/26/2019       

                                                    [facility]                

    

 

                    Respitory Rate                         20                   

       10/26/2019   

                                                    [facility]                

    

 

                    Systolic (mm Hg)                         112                

          10/26/2019

                                                    [facility]                

   



 

                    Diastolic (mm Hg)                         72                

          10/26/2019

                                                    [facility]                

   



 

                    Temperature Oral (F)                         98.5 F         

                

10/26/2019                                                   [facility]       

 

           

 

                    Heart Rate                         75                       

   10/26/2019       

                                                    [facility]                

    

 

                    Respitory Rate                         18                   

       10/26/2019   

                                                    [facility]                

    

 

                    Systolic (mm Hg)                         122                

          10/26/2019

                                                    [facility]                

   



 

                    Diastolic (mm Hg)                         80                

          10/26/2019

                                                    [facility]                

   



 

                    Temperature Oral (F)                         98.4 F         

                

10/26/2019                                                   [facility]       

 

           

 

                    Heart Rate                         70                       

   10/26/2019       

                                                    [facility]                

    

 

                    Respitory Rate                         18                   

       10/26/2019   

                                                    [facility]                

    

 

                    Systolic (mm Hg)                         124                

          10/26/2019

                                                    [facility]                

   



 

                    Diastolic (mm Hg)                         72                

          10/26/2019

                                                    [facility]                

   



 

                    Height                         167.64 cm                    

     10/25/2019     

                                                    [facility]                

    

 

                    Weight                         53.409                       

   10/25/2019       

                                                    [facility]                

    

 

                    BMI Calculated                         19                   

       10/25/2019   

                                                    [facility]                

    

 

                    Height                         165.1 cm                     

    10/25/2019      

                                                    [facility]                

    

 

                    Height                         165.1 cm                     

    10/24/2019      

                                                    [facility]                

    

 

                    BMI Calculated                         19.3                 

         10/24/2019 

                                                    [facility]                

    

 

                    Weight                         52.6                         

 10/24/2019         

                                                    [facility]                

    



                                                                                
                                                                                
                                                                                
                                                                                
                                                                                
                   



Encounters

                    



                    Location                         Location Details           

              

Encounter Type                         Encounter Number                         

Reason For Visit                         Attending Provider                     

                    ADM Date                         DC Date                    

     Status      

                                        Source                    

 

                          [facility]                   

                          

                    Inpatient                         [id number]              

                

                          Boogie Lang                          10/24/

2019   

                          10/26/2019                                            

     

                                        [facility]                    



                                                                        



Procedures

        



                                        No Data Provided for This Section



                                                        



Assessment and Plan

                    



                    Assessment and Plan                         Date            

             Source 

                  

 

                                        Extracted from:Title: Progress note

Author: Boogie Lang MD

Date: 10/25/19

 Impression and Plan

Acute kidney injury with creatinine 2.6resolved

Acute pancreatitis

Hyponatremia and hypokalemia possibly due to vomiting

Dehydration with volume depletion

Plan: Continue monitoring patient's amylase and lipase.  Continue rehydration.  
Patient could be discharged with oral pain medication provided weekend.



Extracted from:Title: General Admission H&P *

Author: Boogie Lang MD

Date: 10/24/19

 Impression and Plan

Acute kidney injury with creatinine 2.6

Acute pancreatitis

Hyponatremia and hypokalemia possibly due to vomiting

Dehydration with volume depletion

Plan: Patient is being admitted for further evaluation.  I will continue patient
with normal saline at 125 cc an hour pain management.  Get urinalysis and urine 
drug screen.  Get lipid panel.  Repeat labs in the morning.  Switch patient to 
inpatient status

                          10/26/2019                         [facility]       

 

           



                                             



Plan of Care





                                        No Data Provided for This Section       

             



                                                                



Social History

                    



                    Social History                         Date                 

        Source      

             

 

                                        Social History TypeResponse

Smoking Status

Current some day smoker; Exposure to Tobacco Smoke None; Cigarette Smoking Last 
365 Days No; Reg Smoking Cessation Counseling No

entered on: 10/24/19

                          10/24/2019                         [facility]       

 

           



                                                                    



Family History

                    



                                        No Data Provided for This Section       

             



                                                            



Advance Directives

                    



                                        No Data Provided for This Section       

             



                                                            



Functional Status

                    



                                        No Data Provided for This Section

## 2020-05-23 NOTE — EMERGENCY DEPARTMENT NOTE
History of Present Illnes


History of Present Illness


Chief Complaint:  Genitourinary


History of Present Illness


This is a 34 year old  male woke up this AM at 0600 with LLQ  pain.  Prior h/o 

of kidney stones and hematuria


 Required:  No


Onset (how long ago):  hour(s) (4)


Radiation:  non-radiation


Severity:  moderate


Onset quality:  sudden


Duration (how long):  hour(s)


Timing of current episode:  constant


Progression:  unchanged


Chronicity:  recurrent


Relieving factors:  none


Exacerbating factors:  none


Associated symptoms:  denies other symptoms


Treatments prior to arrival:  none





Past Medical/Family History


Physician Review


I have reviewed the patient's past medical and family history.  Any updates have

been documented here.





Past Medical History


Recent Fever:  No


Clinical Suspicion of Infectio:  No


New/Unexplained Change in Ment:  No


Other Medical History:  


kidney stones


PUD


hematuria


Past Surgical History:  None





Social History


Smoking Cessation:  Current every day smoker


Counseling Performed:  Yes


Alcohol Use:  None


Any Illegal Drug Use:  No





Review of Systems


Review of Systems


Constitutional:  no symptoms


EENTM:  no symptoms


Cardiovascular:  no symptoms


Respiratory:  no symptoms


Gastrointestinal:  abdominal pain


Genitourinary:  hematuria


Musculoskeletal:  no symptoms


Neurological:  no symptoms


Psychological:  no symptoms


Endocrine:  no symptoms


Hematological/Lymphatic:  no symptoms


Review of other systems


All other systems reviewed and negative.





Physical Exam


Related Data


Allergies:  


Coded Allergies:  


     No Known Allergies (Unverified , 20)


Triage Vital Signs





Vital Signs








  Date Time  Temp Pulse Resp B/P (MAP) Pulse Ox O2 Delivery O2 Flow Rate FiO2


 


20 09:33 98.4 82 18 122/80 99   








Vital signs reviewed:  Yes





Physical Exam


CONSTITUTIONAL





Constitutional:  well-developed, well-nourished


HENT


HENT:  normocephalic, atraumatic, oropharynx clear/moist, nose normal


HENT L/R:  left ext ear normal, right ext ear normal


EYES





Eyes:  PERRL, conjunctivae normal


NECK


Neck:  ROM normal


PULMONARY


Pulmonary:  effort normal, breath sounds normal


CARDIOVASCULAR





Cardiovascular:  regular rhythm, heart sounds normal, capillary refill normal, 

normal rate


GASTROINTESTINAL





Abdominal:  soft, tender (LLQ pain)


GENITOURINARY





Genitourinary:  exam deferred


SKIN


Skin:  warm, dry


MUSCULOSKELETAL





Musculoskeletal:  ROM normal


NEUROLOGICAL





Neurological:  alert, oriented x 3, no gross motor or sensory deficits


PSYCHOLOGICAL


Psychological:  mood/affect normal, judgement normal





Results


Laboratory


Lab results reviewed:  Yes


Laboratory comments


UA : small bilirubin, large blood, protein 100 mg/dl, neg nitrites and 

leukocytes





Imaging


Imaging results reviewed:  Yes


Impressions


                                        


                        [place] [id number]








Patient Name: JAIDEN VILLASENOR                          MR #: [id number]             


: 1985                         Age/Sex: 34/M


Acct #: [id number]                    Req #: [id number]


Adm Physician:                                      


Ordered by: MARTHA QUIROGA DO                    Report #: [id number] 


Location: [facility]                          Room/Bed:           


                                   _____________________________________________


______________________________________________________





Procedure: [id number] HOPD/CT ABD/PEL WO CONTRAST-HOPD


Exam Date: 20                            Exam Time: 0958








                              REPORT STATUS: Signed





EXAMINATION: CT of the abdomen and pelvis without contrast.


 


TECHNIQUE: Spiral CT images of the abdomen and pelvis were performed from the


lung bases to the lesser trochanters.  No intravenous contrast was given per


physician's request.  Coronal and sagittal reformatted images were obtained.





COMPARISON:  None.





CLINICAL HISTORY:Left-sided/left lower quadrant/left groin pain


     


DISCUSSION: ABSENCE OF INTRAVENOUS CONTRAST DECREASES SENSITIVITY FOR DETECTION


OF FOCAL LESIONS AND VASCULAR PATHOLOGY.





ABDOMEN/PELVIS: Exam limited by lack of intraperitoneal fat.





LOWER THORAX: Unremarkable. 





HEPATOBILIARY: No focal hepatic lesions.  No intra or extrahepatic biliary


ductal dilation.





GALLBLADDER: No radio-opaque stones or sludge.  No wall thickening.





SPLEEN: No splenomegaly.





PANCREAS: No focal masses or ductal dilatation.





ADRENALS: No adrenal nodules.





KIDNEYS/URETERS: 3 mm nonobstructing calculus in the right superior pole


(series 3, image 34).


No other renal or ureteral calculi. No hydronephrosis or obstruction.


No contour abnormalities or significant perinephric stranding.





PELVIC ORGANS/BLADDER: Bladder is decompressed but grossly unremarkable.


Prostate is unremarkable. Multiple pelvic phleboliths.





PERITONEUM/RETROPERITONEUM: No free air or fluid.





LYMPH NODES: No intra-abdominal,retroperitoneal, pelvic or inguinal


lymphadenopathy.





VESSELS: Mild to moderate atherosclerotic calcification of the distal abdominal


aorta and proximal iliac vessels.





GI TRACT: No bowel dilation or evidence of obstruction. No pericolonic


inflammatory changes. Tubular structure in the right lower quadrant (coronal


image 36) likely represents the appendix, which is normal in caliber and shows


no surrounding inflammatory changes.





BONES AND SOFT TISSUES: No aggressive lytic or suspicious sclerotic lesions.


Soft tissues are grossly unremarkable. No evidence of hernia.





IMPRESSION: 








1. Exam limited by lack of intravenous contrast and lack of intraperitoneal


fat.





2. No acute abdominopelvic abnormalities, within the limitations of the study.


Specifically, no acute abnormal findings in the left lower quadrant to explain


the patient's pain.





3. 3 mm nonobstructing calculus in the right superior pole. No other renal or


ureteral calculi, hydronephrosis or obstruction.





4. Mild to moderate atherosclerotic calcification of the distal abdominal aorta


and proximal iliac vessels, which is greater than expected for the patient's


age.





Signed by: Dr. Stephanie Zamora M.D. on 2020 10:08 AM








Dictated By: STEPHANIE ZAMORA MD


Electronically Signed By: STEPHANIE ZAMORA MD on 20 1008


Transcribed By: DANIELE on 20 1008 








COPY TO:   MARTHA QUIROGA DO~





Assessment & Plan


Assessment & Plan


Final Impression:  


(1) LLQ abdominal pain


(2) Hematuria


Assessment & Plan


patient with mild improvement in symptoms. Plan to discharge to home with rx 

tylenol #3. Patient keep appt with GI and urology


Depart Disposition:  HOME, SELF-CARE


Last Vital Signs











  Date Time  Temp Pulse Resp B/P (MAP) Pulse Ox O2 Delivery O2 Flow Rate FiO2


 


20 09:33 98.4 82 18 122/80 99   








Medications in the ED





Ketorolac Tromethamine 60 mg ONCE  ONCE IM  Last administered on 20at 

10:09; Admin Dose 60 MG;  Start 20 at 09:45;  Stop 20 at 09:46;  

Status DC


Ketorolac Tromethamine 60 mg STK-MED ONCE .ROUTE ;  Start 20 at 10:11;  

Stop 20 at 10:08;  Status DC











MARTHA QUIROGA DO                May 23, 2020 09:43

## 2020-05-23 NOTE — DIAGNOSTIC IMAGING REPORT
EXAMINATION: CT of the abdomen and pelvis without contrast.

 

TECHNIQUE: Spiral CT images of the abdomen and pelvis were performed from the

lung bases to the lesser trochanters.  No intravenous contrast was given per

physician's request.  Coronal and sagittal reformatted images were obtained.



COMPARISON:  None.



CLINICAL HISTORY:Left-sided/left lower quadrant/left groin pain

     

DISCUSSION: ABSENCE OF INTRAVENOUS CONTRAST DECREASES SENSITIVITY FOR DETECTION

OF FOCAL LESIONS AND VASCULAR PATHOLOGY.



ABDOMEN/PELVIS: Exam limited by lack of intraperitoneal fat.



LOWER THORAX: Unremarkable. 



HEPATOBILIARY: No focal hepatic lesions.  No intra or extrahepatic biliary

ductal dilation.



GALLBLADDER: No radio-opaque stones or sludge.  No wall thickening.



SPLEEN: No splenomegaly.



PANCREAS: No focal masses or ductal dilatation.



ADRENALS: No adrenal nodules.



KIDNEYS/URETERS: 3 mm nonobstructing calculus in the right superior pole

(series 3, image 34).

No other renal or ureteral calculi. No hydronephrosis or obstruction.

No contour abnormalities or significant perinephric stranding.



PELVIC ORGANS/BLADDER: Bladder is decompressed but grossly unremarkable.

Prostate is unremarkable. Multiple pelvic phleboliths.



PERITONEUM/RETROPERITONEUM: No free air or fluid.



LYMPH NODES: No intra-abdominal,retroperitoneal, pelvic or inguinal

lymphadenopathy.



VESSELS: Mild to moderate atherosclerotic calcification of the distal abdominal

aorta and proximal iliac vessels.



GI TRACT: No bowel dilation or evidence of obstruction. No pericolonic

inflammatory changes. Tubular structure in the right lower quadrant (coronal

image 36) likely represents the appendix, which is normal in caliber and shows

no surrounding inflammatory changes.



BONES AND SOFT TISSUES: No aggressive lytic or suspicious sclerotic lesions.

Soft tissues are grossly unremarkable. No evidence of hernia.



IMPRESSION: 





1. Exam limited by lack of intravenous contrast and lack of intraperitoneal

fat.



2. No acute abdominopelvic abnormalities, within the limitations of the study.

Specifically, no acute abnormal findings in the left lower quadrant to explain

the patient's pain.



3. 3 mm nonobstructing calculus in the right superior pole. No other renal or

ureteral calculi, hydronephrosis or obstruction.



4. Mild to moderate atherosclerotic calcification of the distal abdominal aorta

and proximal iliac vessels, which is greater than expected for the patient's

age.



Signed by: Dr. Kelechi Zamora M.D. on 5/23/2020 10:08 AM